# Patient Record
Sex: FEMALE | Race: WHITE | Employment: FULL TIME | URBAN - METROPOLITAN AREA
[De-identification: names, ages, dates, MRNs, and addresses within clinical notes are randomized per-mention and may not be internally consistent; named-entity substitution may affect disease eponyms.]

---

## 2017-01-30 ENCOUNTER — OFFICE VISIT (OUTPATIENT)
Dept: ORTHOPEDIC SURGERY | Age: 62
End: 2017-01-30

## 2017-01-30 VITALS
HEART RATE: 76 BPM | DIASTOLIC BLOOD PRESSURE: 79 MMHG | SYSTOLIC BLOOD PRESSURE: 157 MMHG | BODY MASS INDEX: 20.24 KG/M2 | HEIGHT: 62 IN | WEIGHT: 110 LBS

## 2017-01-30 DIAGNOSIS — M54.12 RADICULOPATHY, CERVICAL: ICD-10-CM

## 2017-01-30 DIAGNOSIS — M48.02 SPINAL STENOSIS, CERVICAL REGION: Primary | ICD-10-CM

## 2017-01-30 DIAGNOSIS — M50.30 OTHER CERVICAL DISC DEGENERATION, UNSPECIFIED CERVICAL REGION: ICD-10-CM

## 2017-01-30 RX ORDER — TOPIRAMATE 25 MG/1
TABLET ORAL
Qty: 180 TAB | Refills: 1 | Status: SHIPPED | OUTPATIENT
Start: 2017-01-30 | End: 2017-06-23 | Stop reason: ALTCHOICE

## 2017-01-30 RX ORDER — TOPIRAMATE 25 MG/1
TABLET ORAL
Qty: 180 TAB | Refills: 1 | Status: SHIPPED | OUTPATIENT
Start: 2017-01-30 | End: 2017-01-30 | Stop reason: CLARIF

## 2017-01-30 NOTE — PROGRESS NOTES
[facility]  [address]  [address], [address] [place]   Phone: [phone]  Fax: [phone]        PROGRESS NOTE      HISTORY OF PRESENT ILLNESS:  The patient is a 62 y.o. female and was seen today for follow up of neck pain into the BUE and low back pain extending into the RLE to the knee. She reports of one episode of severe pain of the LUE with a cold sensation. Pain is exacerbated with activity. Initially, she was seen with c/o neck pain extending into RUE as well as low back/right hip pain without radicular symptoms. Pt reports pain in RUE in a C6-C7 distribution into the hand, which are intermittent She reports LOB and dropping objects have not worsened. She notes her pain is the worst when she is at work or with increased activity in general. Pt has a h/o extensive spinal fusion due to h/o scoliosis. She states her pain worsens as the day progresses. She reports dropping objects but states it has not gotten worse. Pt reports loss of balance and dropping things have worsened and she has noticed she tolerates more frequently. Previously, she reported LOB and dropping objects with her right hand. She reports she had a spinal fusion done due to scoliosis > 30 years ago. She cannot recall at what level. She reports her pain began to get progressively worse 6 weeks ago, she denies any specific injury. She failed NEURONTIN. She tolerates increased Topamax 75 mg qhs with questionable relief. She is not a candidate for Cymbalta as she is taking Celexa and Desyrel. She has been treated with antiinflammatories, muscle relaxers, and steroids without relief. Patient performs her HEP daily. Patient denies change in bowel or bladder habits, but notes her uterus and bladder have dropped. Pt denies hx of glaucoma or DM. RUE EMG dated 6/7/16 was within normal limits. Preliminary reading of the cervical spine plain films revealed, degenerative disc disease at C4-C5 and C5-C6.  No acute pathology identified. Preliminary reading of the thoracic spine plain films revealed, moderate scoliotic curvature of the thoracic spine convex to the right at the L1 level. No acute pathology identified. Cervical spine MRI from 12/16/15 was reviewed. Per report, there was straightening with mild ligamentous laxity at C4-C5. There was degenerative joint and disc disease at C3-C4 through C7-T1. There was posterior bulging/protruding discs at C3-C4 through C7-T1 more significant at C4-C5 and C6-C7. Mild ventral cord deformity at C5-C6 with mild to moderate central canal stenosis. Mild central canal stenosis at C5-C6 and C6-C7. There was foraminal narrowing at C3-C4 through C7-T1 most significant at C5-C6 where it is moderate to severe by degree bilaterally. There was a right thyroid lesion. At her last clinical appointment, she was interested in surgical intervention. The patient weaned off Topamax 75 mg qhs. I tried her on Lyrica 75 mg BID. I encouraged patient to perform her HEP daily. The patient returns today with pain location and distribution remain unchanged. She continues to rate pain 3/10. She reports intolerance to [facility] due to facial/tongue swelling and lethargy. Subsequently, she restarted Topamax 75 mg qhs with benefit.  reviewed. Past Medical History   Diagnosis Date    Psychiatric disorder         Social History     Social History    Marital status: SINGLE     Spouse name: N/A    Number of children: N/A    Years of education: N/A     Occupational History    Not on file.      Social History Main Topics    Smoking status: Never Smoker    Smokeless tobacco: Never Used    Alcohol use No    Drug use: No    Sexual activity: Not on file     Other Topics Concern    Not on file     Social History Narrative       Current Outpatient Prescriptions   Medication Sig Dispense Refill    topiramate (TOPAMAX) 25 mg tablet 3 tabs PO  Tab 1    topiramate (TOPAMAX) 25 mg tablet TAKE 3 PO Q HS 90 Tab 0    meloxicam (MOBIC) 15 mg tablet       topiramate (TOPAMAX) 25 mg tablet TAKE THREE TABLETS BY MOUTH ONCE NIGHTLY AT BEDTIME 90 Tab 0    topiramate (TOPAMAX) 25 mg tablet 3 tabs PO QHS 90 Tab 1    atorvastatin (LIPITOR) 20 mg tablet       citalopram (CELEXA) 40 mg tablet       traZODone (DESYREL) 100 mg tablet       pregabalin (LYRICA) 75 mg capsule Take 1 Cap by mouth two (2) times a day. Max Daily Amount: 150 mg. 60 Cap 1    pregabalin (LYRICA) 75 mg capsule Take 1 Cap by mouth two (2) times a day. Max Daily Amount: 150 mg. 14 Cap 0    solifenacin (VESICARE) 10 mg tablet Take 10 mg by mouth daily.  gabapentin (NEURONTIN) 600 mg tablet Take 1 Tab by mouth three (3) times daily. 90 Tab 2    gabapentin (NEURONTIN) 600 mg tablet Take 1 Tab by mouth three (3) times daily. 90 Tab 0    naproxen (NAPROSYN) 500 mg tablet Take 1 tablet(s) twice a day by oral route for 30 days.  gabapentin (NEURONTIN) 300 mg capsule Take 1 Cap by mouth three (3) times daily. 90 Cap 1    cyclobenzaprine (FLEXERIL) 5 mg tablet       traMADol (ULTRAM) 50 mg tablet          Allergies   Allergen Reactions    Sulfa (Sulfonamide Antibiotics) Anaphylaxis          PHYSICAL EXAMINATION    Visit Vitals    /79    Pulse 76    Ht 5' 2\" (1.575 m)    Wt 110 lb (49.9 kg)    BMI 20.12 kg/m2       CONSTITUTIONAL: NAD, A&O x 3  SENSATION: Decreased sensation to light touch at digits 1-2 of the RUE. Sensation to light touch otherwise intact. NEURO: Edin's is negative bilaterally. RANGE OF MOTION: The patient has full passive range of motion in all four extremities.   MOTOR:  Straight Leg Raise: Negative, bilateral     Shoulder AB/Flex Elbow Flex Wrist Ext Elbow Ext Wrist Flex Hand Intrin Tone   Right +4/5 +4/5 +4/5 +4/5 +4/5 +4/5 +4/5   Left +4/5 +4/5 +4/5 +4/5 +4/5 +4/5 +4/5              Hip Flex Knee Ext Knee Flex Ankle DF GTE Ankle PF Tone   Right +4/5 +4/5 +4/5 +4/5 +4/5 +4/5 +4/5   Left +4/5 +4/5 +4/5 +4/5 +4/5 +4/5 +4/5       ASSESSMENT   Major Casillas was seen today for back pain and follow-up. Diagnoses and all orders for this visit:    Spinal stenosis, cervical region    Other cervical disc degeneration, unspecified cervical region    Radiculopathy, cervical    Other orders  -     Discontinue: topiramate (TOPAMAX) 25 mg tablet; 3 tabs PO QHS  -     topiramate (TOPAMAX) 25 mg tablet; 3 tabs PO BID          IMPRESSION AND PLAN:  Patient is not interested in surgical intervention or lumbar blocks at this time. She reports intolerance to Lyrica and subsequently restarted Topamax 75 mg qhs. I will increase her Topamax to 75 mg BID. I will see the patient back in 1 month's time or earlier if needed. Written by Ye Barillas, as dictated by Marline Freedman MD  I examined the patient, reviewed and agree with the note.

## 2017-01-30 NOTE — MR AVS SNAPSHOT
Visit Information Date & Time Provider Department Dept. Phone Encounter #  
 1/30/2017 10:25 AM Jax Betts MD 4 [facility], [address] and [facility] [place] [phone] [id number] Follow-up Instructions Return in about 4 weeks (around 2/27/2017). Upcoming Health Maintenance Date Due Hepatitis C Screening 1955 DTaP/Tdap/Td series (1 - Tdap) 12/14/1977 PAP AKA CERVICAL CYTOLOGY 12/14/1977 BREAST CANCER SCRN MAMMOGRAM 12/14/2006 FOBT Q 1 YEAR AGE 50-75 12/14/2006 INFLUENZA AGE 9 TO ADULT 8/1/2016 ZOSTER VACCINE AGE 60> 12/14/2016 Allergies as of 1/30/2017  Review Complete On: 1/30/2017 By: Jax Betts MD  
  
 Severity Noted Reaction Type Reactions Sulfa (Sulfonamide Antibiotics) High 10/28/2015    Anaphylaxis Current Immunizations  Never Reviewed No immunizations on file. Not reviewed this visit You Were Diagnosed With   
  
 Codes Comments Spinal stenosis, cervical region    -  Primary ICD-10-CM: M48.02 
ICD-9-CM: 723.0 Other cervical disc degeneration, unspecified cervical region     ICD-10-CM: M50.30 ICD-9-CM: 722.4 Radiculopathy, cervical     ICD-10-CM: M54.12 
ICD-9-CM: 723.4 Vitals BP Pulse Height(growth percentile) Weight(growth percentile) BMI Smoking Status 157/79 76 5' 2\" (1.575 m) 110 lb (49.9 kg) 20.12 kg/m2 Never Smoker Vitals History BMI and BSA Data Body Mass Index Body Surface Area  
 20.12 kg/m 2 1.48 m 2 Preferred Pharmacy Pharmacy Name Phone [facility], [address] [place] [phone] Your Updated Medication List  
  
   
This list is accurate as of: 1/30/17 10:45 AM.  Always use your most recent med list.  
  
  
  
  
 atorvastatin 20 mg tablet Commonly known as:  LIPITOR  
  
 citalopram 40 mg tablet Commonly known as:  CELEXA  
  
 cyclobenzaprine 5 mg tablet Commonly known as:  FLEXERIL  
  
 * gabapentin 300 mg capsule Commonly known as:  NEURONTIN Take 1 Cap by mouth three (3) times daily. * gabapentin 600 mg tablet Commonly known as:  NEURONTIN Take 1 Tab by mouth three (3) times daily. * gabapentin 600 mg tablet Commonly known as:  NEURONTIN Take 1 Tab by mouth three (3) times daily. meloxicam 15 mg tablet Commonly known as:  MOBIC  
  
 NAPROSYN 500 mg tablet Generic drug:  naproxen Take 1 tablet(s) twice a day by oral route for 30 days. * pregabalin 75 mg capsule Commonly known as:  Nina Ruff Take 1 Cap by mouth two (2) times a day. Max Daily Amount: 150 mg.  
  
 * pregabalin 75 mg capsule Commonly known as:  Jinnie Ruff Take 1 Cap by mouth two (2) times a day. Max Daily Amount: 150 mg.  
  
 solifenacin 10 mg tablet Commonly known as:  Radha Fairfax Take 10 mg by mouth daily. * topiramate 25 mg tablet Commonly known as:  TOPAMAX 3 tabs PO QHS * topiramate 25 mg tablet Commonly known as:  TOPAMAX TAKE THREE TABLETS BY MOUTH ONCE NIGHTLY AT BEDTIME  
  
 * topiramate 25 mg tablet Commonly known as:  TOPAMAX TAKE 3 PO Q HS  
  
 * topiramate 25 mg tablet Commonly known as:  TOPAMAX 3 tabs PO BID  
  
 traMADol 50 mg tablet Commonly known as:  ULTRAM  
  
 traZODone 100 mg tablet Commonly known as:  Ken Griffiths * Notice: This list has 9 medication(s) that are the same as other medications prescribed for you. Read the directions carefully, and ask your doctor or other care provider to review them with you. Prescriptions Sent to Pharmacy Refills  
 topiramate (TOPAMAX) 25 mg tablet 1 Sig: 3 tabs PO BID Class: Normal  
 Pharmacy: [address] [facility], [address] [facility] #: [phone] Follow-up Instructions Return in about 4 weeks (around 2/27/2017). Introducing [facility] & HEALTH SERVICES!    
 [facility] introduces [employer] patient portal. Now you can access parts of your medical record, email your doctor's office, and request medication refills online. 1. In your internet browser, go to [email or web address] 2. Click on the First Time User? Click Here link in the Sign In box. You will see the New Member Sign Up page. 3. Enter your AREVS Access Code exactly as it appears below. You will not need to use this code after youve completed the sign-up process. If you do not sign up before the expiration date, you must request a new code. · AREVS Access Code: [email or web address] Expires: 2/26/2017 12:45 PM 
 
4. Enter the last four digits of your Social Security Number (xxxx) and Date of Birth (mm/dd/yyyy) as indicated and click Submit. You will be taken to the next sign-up page. 5. Create a AREVS ID. This will be your AREVS login ID and cannot be changed, so think of one that is secure and easy to remember. 6. Create a AREVS password. You can change your password at any time. 7. Enter your Password Reset Question and Answer. This can be used at a later time if you forget your password. 8. Enter your e-mail address. You will receive e-mail notification when new information is available in [address] 9. Click Sign Up. You can now view and download portions of your medical record. 10. Click the Download Summary menu link to download a portable copy of your medical information. If you have questions, please visit the Frequently Asked Questions section of the AREVS website. Remember, AREVS is NOT to be used for urgent needs. For medical emergencies, dial 911. Now available from your iPhone and Android! Please provide this summary of care documentation to your next provider. Your primary care clinician is listed as Araceli Zapien. If you have any questions after today's visit, please call [phone].

## 2017-02-06 ENCOUNTER — TELEPHONE (OUTPATIENT)
Dept: ORTHOPEDIC SURGERY | Age: 62
End: 2017-02-06

## 2017-02-06 NOTE — TELEPHONE ENCOUNTER
Called patients pharmacy and did a verbal order for the Topamax 25 mg #180 take 3 po bid with 1 refill.

## 2017-02-06 NOTE — TELEPHONE ENCOUNTER
Called and informed patient that the medication has been called into her [address] for the Topamax 25 mg #180 3 po bid with 1 refill. Patient verbalized understanding.

## 2017-02-06 NOTE — TELEPHONE ENCOUNTER
Pt seen 1/30/16:  Pt states dr Nataly Bennett increased her gabapentin but did not write the prescription to accommodate the increase.  pls advise pt p#[phone]

## 2017-06-15 RX ORDER — TOPIRAMATE 25 MG/1
TABLET ORAL
Qty: 180 TAB | Refills: 0 | OUTPATIENT
Start: 2017-06-15

## 2017-06-15 NOTE — TELEPHONE ENCOUNTER
Patient would have already run out of medication. Needs to be evaluated prior to re-starting medication.  Call patient to inform

## 2017-06-23 RX ORDER — TOPIRAMATE 25 MG/1
TABLET ORAL
Qty: 90 TAB | Refills: 0 | Status: SHIPPED | OUTPATIENT
Start: 2017-06-23 | End: 2017-08-22 | Stop reason: SDUPTHER

## 2017-06-23 NOTE — TELEPHONE ENCOUNTER
Patient called back stating she has been taking 3 tabs nightly due to an issue with the last prescription. She is requesting a courtesy fill to get her through the next appointment. Please advise. Per pharm tech at [facility], the order below is the last prescription they received from Dr. Sushil Barros.      topiramate (TOPAMAX) 25 mg tablet  180 Tab 1 1/30/2017 1/30/2017    Sig: 3 tabs PO QHS

## 2017-07-03 ENCOUNTER — OFFICE VISIT (OUTPATIENT)
Dept: ORTHOPEDIC SURGERY | Age: 62
End: 2017-07-03

## 2017-07-03 VITALS
DIASTOLIC BLOOD PRESSURE: 71 MMHG | RESPIRATION RATE: 18 BRPM | WEIGHT: 103.6 LBS | BODY MASS INDEX: 19.06 KG/M2 | HEIGHT: 62 IN | HEART RATE: 89 BPM | SYSTOLIC BLOOD PRESSURE: 146 MMHG

## 2017-07-03 DIAGNOSIS — M54.12 RADICULOPATHY, CERVICAL: ICD-10-CM

## 2017-07-03 DIAGNOSIS — M50.30 OTHER CERVICAL DISC DEGENERATION, UNSPECIFIED CERVICAL REGION: ICD-10-CM

## 2017-07-03 DIAGNOSIS — M48.03 SPINAL STENOSIS, CERVICOTHORACIC REGION: Primary | ICD-10-CM

## 2017-07-03 DIAGNOSIS — M19.041 OSTEOARTHRITIS OF RIGHT HAND, UNSPECIFIED OSTEOARTHRITIS TYPE: ICD-10-CM

## 2017-07-03 RX ORDER — TOPIRAMATE 25 MG/1
TABLET ORAL
Qty: 180 TAB | Refills: 1 | Status: SHIPPED | OUTPATIENT
Start: 2017-07-03 | End: 2018-05-21 | Stop reason: ALTCHOICE

## 2017-07-03 NOTE — MR AVS SNAPSHOT
Visit Information Date & Time Provider Department Dept. Phone Encounter #  
 7/3/2017 10:15 AM Rodolfo Etienne MD 4 [facility], [address] and [facility] - [place] [phone] [id number] Follow-up Instructions Return for following MRI. Upcoming Health Maintenance Date Due Hepatitis C Screening 1955 DTaP/Tdap/Td series (1 - Tdap) 12/14/1977 PAP AKA CERVICAL CYTOLOGY 12/14/1977 BREAST CANCER SCRN MAMMOGRAM 12/14/2006 FOBT Q 1 YEAR AGE 50-75 12/14/2006 ZOSTER VACCINE AGE 60> 12/14/2016 INFLUENZA AGE 9 TO ADULT 8/1/2017 Allergies as of 7/3/2017  Review Complete On: 7/3/2017 By: Rodolfo Etienne MD  
  
 Severity Noted Reaction Type Reactions Sulfa (Sulfonamide Antibiotics) High 10/28/2015    Anaphylaxis Current Immunizations  Never Reviewed No immunizations on file. Not reviewed this visit You Were Diagnosed With   
  
 Codes Comments Spinal stenosis, cervicothoracic region    -  Primary ICD-10-CM: M48.03 
ICD-9-CM: 723.0 Other cervical disc degeneration, unspecified cervical region     ICD-10-CM: M50.30 ICD-9-CM: 722.4 Radiculopathy, cervical     ICD-10-CM: M54.12 
ICD-9-CM: 723.4 Osteoarthritis of right hand, unspecified osteoarthritis type     ICD-10-CM: M19.041 ICD-9-CM: 715.94 1st digit Vitals BP Pulse Resp Height(growth percentile) Weight(growth percentile) BMI  
 146/71 89 18 5' 2\" (1.575 m) 103 lb 9.6 oz (47 kg) 18.95 kg/m2 Smoking Status Never Smoker Vitals History BMI and BSA Data Body Mass Index Body Surface Area 18.95 kg/m 2 1.43 m 2 Preferred Pharmacy Pharmacy Name Phone [facility] [place] [address], [address] [place] [phone] Your Updated Medication List  
  
   
This list is accurate as of: 7/3/17 10:39 AM.  Always use your most recent med list.  
  
  
  
  
 atorvastatin 20 mg tablet Commonly known as:  LIPITOR  
  
 citalopram 40 mg tablet Commonly known as:  CELEXA  
  
 cyclobenzaprine 5 mg tablet Commonly known as:  FLEXERIL  
  
 * gabapentin 300 mg capsule Commonly known as:  NEURONTIN Take 1 Cap by mouth three (3) times daily. * gabapentin 600 mg tablet Commonly known as:  NEURONTIN Take 1 Tab by mouth three (3) times daily. * gabapentin 600 mg tablet Commonly known as:  NEURONTIN Take 1 Tab by mouth three (3) times daily. meloxicam 15 mg tablet Commonly known as:  MOBIC  
  
 NAPROSYN 500 mg tablet Generic drug:  naproxen Take 1 tablet(s) twice a day by oral route for 30 days. * pregabalin 75 mg capsule Commonly known as:  Jesus Leyland Take 1 Cap by mouth two (2) times a day. Max Daily Amount: 150 mg.  
  
 * pregabalin 75 mg capsule Commonly known as:  Jesus Leyland Take 1 Cap by mouth two (2) times a day. Max Daily Amount: 150 mg.  
  
 solifenacin 10 mg tablet Commonly known as:  Rolando Montesinos Take 10 mg by mouth daily. * topiramate 25 mg tablet Commonly known as:  TOPAMAX 3 tabs PO QHS * topiramate 25 mg tablet Commonly known as:  TOPAMAX 3 tabs PO BID  
  
 traMADol 50 mg tablet Commonly known as:  ULTRAM  
  
 traZODone 100 mg tablet Commonly known as:  Terpippa Rader * Notice: This list has 7 medication(s) that are the same as other medications prescribed for you. Read the directions carefully, and ask your doctor or other care provider to review them with you. Prescriptions Sent to Pharmacy Refills  
 topiramate (TOPAMAX) 25 mg tablet 1 Sig: 3 tabs PO BID Class: Normal  
 Pharmacy: [facility], [address] [facility] #: [phone] We Performed the Following REFERRAL TO ORTHOPEDICS [JZC357 Custom] Comments:  
 eval right first MCP- pain/arthritis Follow-up Instructions Return for following MRI. To-Do List   
 07/10/2017 Imaging:  MRI CERV SPINE WO CONT Referral Information Referral ID Referred By Referred To  
  
 [id number] ROSA GONZÁLES III Not Available Visits Status Start Date End Date 1 New Request 7/3/17 7/3/18 If your referral has a status of pending review or denied, additional information will be sent to support the outcome of this decision. Introducing [facility] & HEALTH SERVICES! Javier Payne introduces [employer] patient portal. Now you can access parts of your medical record, email your doctor's office, and request medication refills online. 1. In your internet browser, go to [email or web address] 2. Click on the First Time User? Click Here link in the Sign In box. You will see the New Member Sign Up page. 3. Enter your [employer] Access Code exactly as it appears below. You will not need to use this code after youve completed the sign-up process. If you do not sign up before the expiration date, you must request a new code. · [employer] Access Code: [email or web address]. Expires: 10/1/2017 10:29 AM 
 
4. Enter the last four digits of your Social Security Number (xxxx) and Date of Birth (mm/dd/yyyy) as indicated and click Submit. You will be taken to the next sign-up page. 5. Create a [employer] ID. This will be your [employer] login ID and cannot be changed, so think of one that is secure and easy to remember. 6. Create a [employer] password. You can change your password at any time. 7. Enter your Password Reset Question and Answer. This can be used at a later time if you forget your password. 8. Enter your e-mail address. You will receive e-mail notification when new information is available in [address] 9. Click Sign Up. You can now view and download portions of your medical record. 10. Click the Download Summary menu link to download a portable copy of your medical information.  
 
If you have questions, please visit the Frequently Asked Questions section of the brick&mobile. Remember, [employer] is NOT to be used for urgent needs. For medical emergencies, dial 911. Now available from your iPhone and Android! Please provide this summary of care documentation to your next provider. Your primary care clinician is listed as Shanice Callejas. If you have any questions after today's visit, please call [phone].

## 2017-07-03 NOTE — PROGRESS NOTES
[facility]  [address], [address]  Phone: [phone]  Fax: [phone]        PROGRESS NOTE      HISTORY OF PRESENT ILLNESS:  The patient is a 62 y.o. female and was seen today for follow up of neck pain into the BUE and low back pain extending into the RLE to the knee. She reports of one episode of severe pain of the LUE with a cold sensation. Pain is exacerbated with activity. Initially, she was seen with c/o neck pain extending into RUE as well as low back/right hip pain without radicular symptoms. Pt reports pain in RUE in a C6-C7 distribution into the hand, which are intermittent. She notes her pain is the worst when she is at work or with increased activity in general. Pt has a h/o extensive spinal fusion due to h/o scoliosis. She states her pain worsens as the day progresses. She reports dropping objects but states it has not gotten worse. Pt reports loss of balance and dropping things have worsened and she has noticed she tolerates more frequently. Previously, she reported LOB and dropping objects with her right hand. She reports she had a spinal fusion done due to scoliosis > 30 years ago. She cannot recall at what level. She reports her pain began to get progressively worse 6 weeks ago, she denies any specific injury. She failed NEURONTIN. She tolerates increased Topamax 75 mg qhs with questionable relief. She reports intolerance to [facility] due to facial/tongue swelling and lethargy. Subsequently, she restarted Topamax 75 mg qhs with benefit. She is not a candidate for Cymbalta as she is taking Celexa and Desyrel. She has been treated with antiinflammatories, muscle relaxers, and steroids without relief. Patient performs her HEP daily. Patient denies change in bowel or bladder habits, but notes her uterus and bladder have dropped. Pt denies hx of glaucoma or DM. A RUE EMG dated 6/7/16 was within normal limits.  Preliminary reading of the cervical spine plain films revealed, degenerative disc disease at C4-C5 and C5-C6. No acute pathology identified. Preliminary reading of the thoracic spine plain films revealed, moderate scoliotic curvature of the thoracic spine convex to the right at the L1 level. No acute pathology identified. Cervical spine MRI from 12/16/15 was reviewed. Per report, there was straightening with mild ligamentous laxity at C4-C5. There was degenerative joint and disc disease at C3-C4 through C7-T1. There was posterior bulging/protruding discs at C3-C4 through C7-T1 more significant at C4-C5 and C6-C7. Mild ventral cord deformity at C5-C6 with mild to moderate central canal stenosis. Mild central canal stenosis at C5-C6 and C6-C7. There was foraminal narrowing at C3-C4 through C7-T1 most significant at C5-C6 where it is moderate to severe by degree bilaterally. There was a right thyroid lesion. At her last clinical appointment, patient was not interested in surgical intervention or lumbar blocks at that time. She reported intolerance to Lyrica and subsequently restarted Topamax 75 mg qhs. I increased her Topamax to 75 mg BID. The patient returns today with low back and right buttock pain extending in an S1 distribution to the knee along with a burning sensation of the right ankle. Pt also endorses neck pain radiating into the RUE in a C6/7 distribution to the hand with paraesthesias. Her LUE continues to do well. Her primary complaint currently is of first metacarpophalangeal joint pain, first digit of the RUE, which I believe is more consistent with arthritis. She rates pain 4-10/10, elevated since her last visit (3/10). Pt reports progressive LOB and impairments in manual dexterity with the RUE. She reports she has seen Dr. Jason Chaudhry for her right hand pain in the past who administered a cortisone injection with good relief.  Pt states her last prescription for Topamax was written as 25 mg 3 tabs qhs and was unable to increase to 75 mg BID.  reviewed. Past Medical History:   Diagnosis Date    Psychiatric disorder         Social History     Social History    Marital status: SINGLE     Spouse name: N/A    Number of children: N/A    Years of education: N/A     Occupational History    Not on file. Social History Main Topics    Smoking status: Never Smoker    Smokeless tobacco: Never Used    Alcohol use No    Drug use: No    Sexual activity: Not on file     Other Topics Concern    Not on file     Social History Narrative       Current Outpatient Prescriptions   Medication Sig Dispense Refill    topiramate (TOPAMAX) 25 mg tablet 3 tabs PO  Tab 1    topiramate (TOPAMAX) 25 mg tablet 3 tabs PO QHS 90 Tab 0    meloxicam (MOBIC) 15 mg tablet       citalopram (CELEXA) 40 mg tablet       traZODone (DESYREL) 100 mg tablet       pregabalin (LYRICA) 75 mg capsule Take 1 Cap by mouth two (2) times a day. Max Daily Amount: 150 mg. 60 Cap 1    pregabalin (LYRICA) 75 mg capsule Take 1 Cap by mouth two (2) times a day. Max Daily Amount: 150 mg. 14 Cap 0    solifenacin (VESICARE) 10 mg tablet Take 10 mg by mouth daily.  gabapentin (NEURONTIN) 600 mg tablet Take 1 Tab by mouth three (3) times daily. 90 Tab 2    gabapentin (NEURONTIN) 600 mg tablet Take 1 Tab by mouth three (3) times daily. 90 Tab 0    naproxen (NAPROSYN) 500 mg tablet Take 1 tablet(s) twice a day by oral route for 30 days.  gabapentin (NEURONTIN) 300 mg capsule Take 1 Cap by mouth three (3) times daily.  90 Cap 1    atorvastatin (LIPITOR) 20 mg tablet       cyclobenzaprine (FLEXERIL) 5 mg tablet       traMADol (ULTRAM) 50 mg tablet          Allergies   Allergen Reactions    Sulfa (Sulfonamide Antibiotics) Anaphylaxis        PHYSICAL EXAMINATION    Visit Vitals    /71    Pulse 89    Resp 18    Ht 5' 2\" (1.575 m)    Wt 103 lb 9.6 oz (47 kg)    BMI 18.95 kg/m2       CONSTITUTIONAL: NAD, A&O x 3  SENSATION: Intact to light touch throughout  NEURO: Edin's is negative bilaterally. RANGE OF MOTION: The patient has full passive range of motion in all four extremities. MOTOR:  Straight Leg Raise: Negative, bilateral  Tandem Gait: Mild LOB     Shoulder AB/Flex Elbow Flex Wrist Ext Elbow Ext Wrist Flex Hand Intrin Tone   Right +4/5 +4/5 +4/5 +4/5 +4/5 +4/5 +4/5   Left +4/5 +4/5 +4/5 +4/5 +4/5 +4/5 +4/5              Hip Flex Knee Ext Knee Flex Ankle DF GTE Ankle PF Tone   Right +4/5 +4/5 +4/5 +4/5 +4/5 +4/5 +4/5   Left +4/5 +4/5 +4/5 +4/5 +4/5 +4/5 +4/5       ASSESSMENT   [place] was seen today for neck pain, back pain, numbness, ankle pain, hand pain and leg pain. Diagnoses and all orders for this visit:    Spinal stenosis, cervicothoracic region  -     REFERRAL TO ORTHOPEDICS  -     MRI CERV SPINE WO CONT; Future    Other cervical disc degeneration, unspecified cervical region  -     REFERRAL TO ORTHOPEDICS  -     MRI CERV SPINE WO CONT; Future    Radiculopathy, cervical  -     REFERRAL TO ORTHOPEDICS  -     MRI CERV SPINE WO CONT; Future    Osteoarthritis of right hand, unspecified osteoarthritis type  Comments:  1st digit  Orders:  -     REFERRAL TO ORTHOPEDICS  -     MRI CERV SPINE WO CONT; Future    Other orders  -     topiramate (TOPAMAX) 25 mg tablet; 3 tabs PO BID          IMPRESSION AND PLAN:  The patient is be considering cervical spinal surgery as her LOB and impairments in manual dexterity have slowly progressed. I will set her up for a cervical spine MRI. Concerning her right first MCP joint pain, I will refer to ortho for further evaluation. I will increase her Topamax 75 mg to BID. Patient advised to call the office if intolerant to higher dose. I will see the patient back following MRI. Written by Nicolas Paul, as dictated by Becca Hilton MD  I examined the patient, reviewed and agree with the note.

## 2017-07-17 ENCOUNTER — TELEPHONE (OUTPATIENT)
Dept: ORTHOPEDIC SURGERY | Age: 62
End: 2017-07-17

## 2017-07-17 NOTE — TELEPHONE ENCOUNTER
While attempting to schedule a MRI of the Cervical Spine, patient stated she is supposed to be getting other or more testing. When questioned closer, she could not identify any other testing by name but feels she should be having more done. Please verify testing request for additional services and call patient to advise. Thank you. Please let me know which changes if any are to be made so I may proceed with the MRI scheduling. Thank you again.

## 2017-07-17 NOTE — TELEPHONE ENCOUNTER
I will call again to schedule her MRI. Please documentation only notes for further information. Thank you.

## 2017-07-17 NOTE — TELEPHONE ENCOUNTER
After looking at the office note from her last appointment, it looks like we were referring her to Ortho. That appointment is with Dr. Shine Villegas on 7-26-17. I spoke with . Malachi Davis and she states that she was calling because she had not heard anything about her MRI. I informed her that Ana Bullock would be sending the order over to get her scheduled.

## 2017-07-26 ENCOUNTER — OFFICE VISIT (OUTPATIENT)
Dept: ORTHOPEDIC SURGERY | Age: 62
End: 2017-07-26

## 2017-07-26 VITALS
BODY MASS INDEX: 19.62 KG/M2 | TEMPERATURE: 97.7 F | WEIGHT: 106.6 LBS | SYSTOLIC BLOOD PRESSURE: 125 MMHG | HEIGHT: 62 IN | RESPIRATION RATE: 18 BRPM | DIASTOLIC BLOOD PRESSURE: 67 MMHG | OXYGEN SATURATION: 100 % | HEART RATE: 73 BPM

## 2017-07-26 DIAGNOSIS — R20.2 NUMBNESS AND TINGLING IN RIGHT HAND: ICD-10-CM

## 2017-07-26 DIAGNOSIS — R20.0 NUMBNESS AND TINGLING IN RIGHT HAND: ICD-10-CM

## 2017-07-26 DIAGNOSIS — M41.50 DEGENERATIVE SCOLIOSIS: ICD-10-CM

## 2017-07-26 DIAGNOSIS — M79.644 THUMB PAIN, RIGHT: Primary | ICD-10-CM

## 2017-07-26 RX ORDER — BETAMETHASONE SODIUM PHOSPHATE AND BETAMETHASONE ACETATE 3; 3 MG/ML; MG/ML
3 INJECTION, SUSPENSION INTRA-ARTICULAR; INTRALESIONAL; INTRAMUSCULAR; SOFT TISSUE ONCE
Qty: 0.5 ML | Refills: 0
Start: 2017-07-26 | End: 2017-07-26

## 2017-07-26 RX ORDER — BUPIVACAINE HYDROCHLORIDE 2.5 MG/ML
0.5 INJECTION, SOLUTION EPIDURAL; INFILTRATION; INTRACAUDAL ONCE
Qty: 0.5 ML | Refills: 0
Start: 2017-07-26 | End: 2017-07-26

## 2017-07-26 RX ORDER — DICLOFENAC EPOLAMINE 0.01 G/1
1 PATCH TOPICAL EVERY 12 HOURS
Qty: 30 PATCH | Refills: 2 | Status: SHIPPED | OUTPATIENT
Start: 2017-07-26 | End: 2020-09-21 | Stop reason: SDUPTHER

## 2017-07-26 NOTE — PROGRESS NOTES
Patient: Pablo Hernandez                MRN: [id number]       SSN: [id number]  YOB: 1955        AGE: 62 y.o. SEX: female    PCP: Leo Riley MD  07/26/17    Chief Complaint   Patient presents with    Hand Pain     Right     HISTORY:  Pablo Hernandez is a 62 y.o. female who is seen for right hand pain. She was referred by Dr. Magui Stuart. She notes sharp stabbing pain with tingling and numbness in the right hand. Pt states that she had two prior EMG/NCS by Dr. Magui Stuart and per Pt, the results were normal. She notes difficulty with pinching, gripping and opening doors. She has a history of cervical spinal stenosis. Her pain occasionally radiates up to the right elbow and occasionally to the shoulder. She notes a history of scoliosis. Pt has been taking ibuprofen and Mobic. Pain Assessment  7/26/2017   Location of Pain Hand   Pain Location Comment -   Location Modifiers Right   Severity of Pain 3   Quality of Pain Throbbing; [place]; Aching;Burning   Quality of Pain Comment -   Duration of Pain Persistent   Frequency of Pain Constant   Aggravating Factors Bending;Stretching;Straightening   Aggravating Factors Comment -   Limiting Behavior Yes   Relieving Factors Nothing   Relieving Factors Comment -   Result of Injury No     Occupation, etc:  Ms. Donaldo Tariq works as a  at [employer] in [employer] [facility]. She has been working at [employer] for 8 years and is considering long-term in the near future due to back related disability. Pt lives in [place] with her mother. Current weight is 106 pounds. She is 5'2\" tall. She is right hand dominant. Pt has two 21 y.o. grandchildren and one 12 y.o grandchild. She has an 37 yo son.      No results found for: HBA1C, HGBE8, KLE8PVYH, ESS7SBNN, AJI2HVPO  Weight Metrics 7/26/2017 7/3/2017 1/30/2017 11/28/2016 8/9/2016 6/6/2016 3/4/2016   Weight 106 lb 9.6 oz 103 lb 9.6 oz 110 lb 111 lb 12.8 oz 118 lb 124 lb 126 lb 9.6 oz   BMI 19.5 kg/m2 18.95 kg/m2 20.12 kg/m2 20.78 kg/m2 21.94 kg/m2 23.05 kg/m2 21.07 kg/m2       Patient Active Problem List   Diagnosis Code    Cervical pain M54.2    Lumbago M54.5    Notalgia M54.9    Cervical spinal stenosis M48.02    Cervical neuritis M54.12    DDD (degenerative disc disease), cervical M50.30    Spinal stenosis, cervical region M48.02    Radiculopathy, cervical region M54.12    Other cervical disc degeneration, unspecified cervical region M50.30    Spinal stenosis in cervical region M48.02    Radiculopathy, cervical M54.12     REVIEW OF SYSTEMS: All Below are Negative except: See HPI   Constitutional: negative for fever, chills, and weight loss. Cardiovascular: negative for chest pain, claudication, leg swelling, SOB, ROWLEY   Gastrointestinal: Negative for pain, N/V/C/D, Blood in stool or urine, dysuria,  hematuria, incontinence, pelvic pain. Musculoskeletal: See HPI   Neurological: Negative for dizziness and weakness. Negative for headaches, Visual changes, confusion, seizures   Phychiatric/Behavioral: Negative for depression, memory loss, substance  abuse. Extremities: Negative for hair changes, rash, or skin lesion changes. Hematologic: Negative for bleeding problems, bruising, pallor or swollen lymph  nodes   Peripheral Vascular: No calf pain, no circulation deficits. Social History     Social History    Marital status: SINGLE     Spouse name: N/A    Number of children: N/A    Years of education: N/A     Occupational History    Not on file.      Social History Main Topics    Smoking status: Never Smoker    Smokeless tobacco: Never Used    Alcohol use No    Drug use: No    Sexual activity: Not on file     Other Topics Concern    Not on file     Social History Narrative      Allergies   Allergen Reactions    Sulfa (Sulfonamide Antibiotics) Anaphylaxis      Current Outpatient Prescriptions   Medication Sig    topiramate (TOPAMAX) 25 mg tablet 3 tabs PO QHS    meloxicam (MOBIC) 15 mg tablet     citalopram (CELEXA) 40 mg tablet     traZODone (DESYREL) 100 mg tablet     topiramate (TOPAMAX) 25 mg tablet 3 tabs PO BID    pregabalin (LYRICA) 75 mg capsule Take 1 Cap by mouth two (2) times a day. Max Daily Amount: 150 mg.  pregabalin (LYRICA) 75 mg capsule Take 1 Cap by mouth two (2) times a day. Max Daily Amount: 150 mg.    solifenacin (VESICARE) 10 mg tablet Take 10 mg by mouth daily.  gabapentin (NEURONTIN) 600 mg tablet Take 1 Tab by mouth three (3) times daily.  gabapentin (NEURONTIN) 600 mg tablet Take 1 Tab by mouth three (3) times daily.  naproxen (NAPROSYN) 500 mg tablet Take 1 tablet(s) twice a day by oral route for 30 days.  gabapentin (NEURONTIN) 300 mg capsule Take 1 Cap by mouth three (3) times daily.  atorvastatin (LIPITOR) 20 mg tablet     cyclobenzaprine (FLEXERIL) 5 mg tablet     traMADol (ULTRAM) 50 mg tablet      No current facility-administered medications for this visit. PHYSICAL EXAMINATION:  Visit Vitals    /67    Pulse 73    Temp 97.7 °F (36.5 °C) (Oral)    Resp 18    Ht 5' 2\" (1.575 m)    Wt 106 lb 9.6 oz (48.4 kg)    SpO2 100%    BMI 19.5 kg/m2      ORTHO EXAMINATION:  Examination Right Hand Left Hand   Skin Intact Intact   Deformity - -   Swelling - -   Tenderness + base 1st MC -   Finger flexion Full Full   Finger extension Full Full   Sensation Normal Normal   Capillary refill Normal Normal   Heberden's nodes + +   Dupuytren's - -     Examination Right Wrist Left Wrist   Skin Intact Intact   Tenderness + radial wrist and base of thumb -   Flexion 30 40   Extension 30 30   Deformity - -   Effusion - -   Finger flexion Full Full   Finger extension Full Full   Tinel's sign - -   Phalen's test - -   Finklestein maneuver - -   Pain with thumb abduction + -   Capillary refill - -     Palpable osteophyte at the base of the first metacarpal. 45 degrees of abduction of the right thumb; 90 degrees in the left. Mild thenar atrophy.      PROCEDURE: After discussing treatment options, patient's right 1st metacarpal was injected with 4 cc Marcaine and 1/2 cc Celestone. Chart reviewed for the following:   Roel Pena MD, have reviewed the History, Physical and updated the Allergic reactions for [place] performed immediately prior to start of procedure:  Roel Pena MD, have performed the following reviews on Saleem Bush prior to the start of the procedure:            * Patient was identified by name and date of birth   * Agreement on procedure being performed was verified  * Risks and Benefits explained to the patient  * Procedure site verified and marked as necessary  * Patient was positioned for comfort  * Consent was obtained     Time: 9:46 AM     Date of procedure: 7/26/2017    Procedure performed by:  Nini Portillo MD    Ms. Hagan tolerated the procedure well with no complications. IMPRESSION:      ICD-10-CM ICD-9-CM    1. Thumb pain, right M79.644 729.5    2. Degenerative scoliosis M41.9 737.30    3. Numbness and tingling in right hand R20.0 782.0     R20.2          PLAN:  After discussing treatment options, patient's right first metacarpal was injected with 1/2 cc Marcaine and 1/2 cc Celestone. She will follow up as needed. There is no need for surgery at this time.       Scribed by Tiffany Williamson ([address]) as dictated by Nini Portillo MD

## 2017-07-26 NOTE — MR AVS SNAPSHOT
Visit Information Date & Time Provider Department Dept. Phone Encounter #  
 7/26/2017  8:45 AM Denisa Shahid, [address] [facility]  Your Appointments 8/22/2017  8:10 AM  
DIAG TEST F/U with Donna Tong MD  
[facility] ([address]) Appt Note: MRI RESULTS  
 [address] [facility] [id number]  
[address] [facility] Upcoming Health Maintenance Date Due Hepatitis C Screening 1955 DTaP/Tdap/Td series (1 - Tdap) 12/14/1977 PAP AKA CERVICAL CYTOLOGY 12/14/1977 BREAST CANCER SCRN MAMMOGRAM 12/14/2006 FOBT Q 1 YEAR AGE 50-75 12/14/2006 ZOSTER VACCINE AGE 60> 10/14/2016 INFLUENZA AGE 9 TO ADULT 8/1/2017 Allergies as of 7/26/2017  Review Complete On: 7/26/2017 By: Denisa Shahid MD  
  
 Severity Noted Reaction Type Reactions Sulfa (Sulfonamide Antibiotics) High 10/28/2015    Anaphylaxis Current Immunizations  Never Reviewed No immunizations on file. Not reviewed this visit You Were Diagnosed With   
  
 Codes Comments Thumb pain, right    -  Primary ICD-10-CM: N58.717 ICD-9-CM: 729.5 Degenerative scoliosis     ICD-10-CM: M41.9 ICD-9-CM: 737.30 Numbness and tingling in right hand     ICD-10-CM: R20.0, R20.2 ICD-9-CM: 806. 0 Vitals BP Pulse Temp Resp Height(growth percentile) Weight(growth percentile) 125/67 73 97.7 °F (36.5 °C) (Oral) 18 5' 2\" (1.575 m) 106 lb 9.6 oz (48.4 kg) SpO2 BMI Smoking Status 100% 19.5 kg/m2 Never Smoker BMI and BSA Data Body Mass Index Body Surface Area 19.5 kg/m 2 1.46 m 2 Preferred Pharmacy Pharmacy Name Phone [facility] [address] [place] [phone] Your Updated Medication List  
  
   
 This list is accurate as of: 7/26/17  9:50 AM.  Always use your most recent med list.  
  
  
  
  
 atorvastatin 20 mg tablet Commonly known as:  LIPITOR  
  
 citalopram 40 mg tablet Commonly known as:  CELEXA  
  
 cyclobenzaprine 5 mg tablet Commonly known as:  FLEXERIL  
  
 * gabapentin 300 mg capsule Commonly known as:  NEURONTIN Take 1 Cap by mouth three (3) times daily. * gabapentin 600 mg tablet Commonly known as:  NEURONTIN Take 1 Tab by mouth three (3) times daily. * gabapentin 600 mg tablet Commonly known as:  NEURONTIN Take 1 Tab by mouth three (3) times daily. meloxicam 15 mg tablet Commonly known as:  MOBIC  
  
 NAPROSYN 500 mg tablet Generic drug:  naproxen Take 1 tablet(s) twice a day by oral route for 30 days. * pregabalin 75 mg capsule Commonly known as:  Jesus [place] Take 1 Cap by mouth two (2) times a day. Max Daily Amount: 150 mg.  
  
 * pregabalin 75 mg capsule Commonly known as:  Jesus Leyland Take 1 Cap by mouth two (2) times a day. Max Daily Amount: 150 mg.  
  
 solifenacin 10 mg tablet Commonly known as:  Marshall Chisago Take 10 mg by mouth daily. * topiramate 25 mg tablet Commonly known as:  TOPAMAX 3 tabs PO QHS * topiramate 25 mg tablet Commonly known as:  TOPAMAX 3 tabs PO BID  
  
 traMADol 50 mg tablet Commonly known as:  ULTRAM  
  
 traZODone 100 mg tablet Commonly known as:  Teryl [place] * Notice: This list has 7 medication(s) that are the same as other medications prescribed for you. Read the directions carefully, and ask your doctor or other care provider to review them with you. Introducing [facility]! Dear Herman Ho: Thank you for requesting a [employer] account. Our records indicate that you already have an active [employer] account. You can access your account anytime at [email or web address] Did you know that you can access your hospital and ER discharge instructions at any time in [employer]? You can also review all of your test results from your hospital stay or ER visit. Additional Information If you have questions, please visit the Frequently Asked Questions section of the [employer] website at [email or web address] Remember, [employer] is NOT to be used for urgent needs. For medical emergencies, dial 911. Now available from your iPhone and Android! Please provide this summary of care documentation to your next provider. Your primary care clinician is listed as Romana Bracket. If you have any questions after today's visit, please call [phone].

## 2017-07-31 ENCOUNTER — DOCUMENTATION ONLY (OUTPATIENT)
Dept: ORTHOPEDIC SURGERY | Age: 62
End: 2017-07-31

## 2017-07-31 NOTE — PROGRESS NOTES
MRI Cervical without is scheduled at [facility], [id number], on 08/07/17, arrive 5:00PM, test 5:15PM. Pre-authorization [id number], effective 07/31/17-08/29/17

## 2017-08-22 ENCOUNTER — OFFICE VISIT (OUTPATIENT)
Dept: ORTHOPEDIC SURGERY | Age: 62
End: 2017-08-22

## 2017-08-22 VITALS
SYSTOLIC BLOOD PRESSURE: 138 MMHG | HEIGHT: 62 IN | WEIGHT: 102.8 LBS | HEART RATE: 78 BPM | DIASTOLIC BLOOD PRESSURE: 82 MMHG | RESPIRATION RATE: 16 BRPM | BODY MASS INDEX: 18.92 KG/M2

## 2017-08-22 DIAGNOSIS — M48.03 SPINAL STENOSIS, CERVICOTHORACIC REGION: Primary | ICD-10-CM

## 2017-08-22 DIAGNOSIS — M50.30 OTHER CERVICAL DISC DEGENERATION, UNSPECIFIED CERVICAL REGION: ICD-10-CM

## 2017-08-22 DIAGNOSIS — M19.041 PRIMARY OSTEOARTHRITIS, RIGHT HAND: ICD-10-CM

## 2017-08-22 DIAGNOSIS — M54.12 RADICULOPATHY, CERVICAL: ICD-10-CM

## 2017-08-22 RX ORDER — IBUPROFEN 200 MG
TABLET ORAL
COMMUNITY
End: 2018-05-21 | Stop reason: ALTCHOICE

## 2017-08-22 NOTE — MR AVS SNAPSHOT
Visit Information Date & Time Provider Department Dept. Phone Encounter #  
 8/22/2017  8:10 AM Donna Tong  [facility], [address] and [facility] - [place] [address]  Follow-up Instructions Return in about 3 months (around 11/22/2017). Upcoming Health Maintenance Date Due Hepatitis C Screening 1955 DTaP/Tdap/Td series (1 - Tdap) 12/14/1977 PAP AKA CERVICAL CYTOLOGY 12/14/1977 BREAST CANCER SCRN MAMMOGRAM 12/14/2006 FOBT Q 1 YEAR AGE 50-75 12/14/2006 ZOSTER VACCINE AGE 60> 10/14/2016 INFLUENZA AGE 9 TO ADULT 8/1/2017 Allergies as of 8/22/2017  Review Complete On: 8/22/2017 By: Donna Tong MD  
  
 Severity Noted Reaction Type Reactions Sulfa (Sulfonamide Antibiotics) High 10/28/2015    Anaphylaxis Current Immunizations  Never Reviewed No immunizations on file. Not reviewed this visit You Were Diagnosed With   
  
 Codes Comments Spinal stenosis, cervicothoracic region    -  Primary ICD-10-CM: M48.03 
ICD-9-CM: 723.0 Other cervical disc degeneration, unspecified cervical region     ICD-10-CM: M50.30 ICD-9-CM: 722.4 Vitals BP Pulse Resp Height(growth percentile) Weight(growth percentile) BMI  
 138/82 78 16 5' 2\" (1.575 m) 102 lb 12.8 oz (46.6 kg) 18.8 kg/m2 Smoking Status Never Smoker Vitals History BMI and BSA Data Body Mass Index Body Surface Area  
 18.8 kg/m 2 1.43 m 2 Preferred Pharmacy Pharmacy Name Phone [facility] [address] [facility] [phone] Your Updated Medication List  
  
   
This list is accurate as of: 8/22/17  8:40 AM.  Always use your most recent med list.  
  
  
  
  
 atorvastatin 20 mg tablet Commonly known as:  LIPITOR  
  
 citalopram 40 mg tablet Commonly known as:  CELEXA  
  
 cyclobenzaprine 5 mg tablet Commonly known as:  FLEXERIL  
  
 diclofenac 1.3 % Pt12 Commonly known as:  FLECTOR  
1 Patch by TransDERmal route every twelve (12) hours every twelve (12) hours. gabapentin 600 mg tablet Commonly known as:  NEURONTIN Take 1 Tab by mouth three (3) times daily. ibuprofen 200 mg tablet Commonly known as:  MOTRIN Take  by mouth.  
  
 meloxicam 15 mg tablet Commonly known as:  MOBIC  
  
 NAPROSYN 500 mg tablet Generic drug:  naproxen Take 1 tablet(s) twice a day by oral route for 30 days. pregabalin 75 mg capsule Commonly known as:  Beba Walker Take 1 Cap by mouth two (2) times a day. Max Daily Amount: 150 mg.  
  
 solifenacin 10 mg tablet Commonly known as:  Marga Cindy Take 10 mg by mouth daily. topiramate 25 mg tablet Commonly known as:  TOPAMAX 3 tabs PO BID  
  
 traMADol 50 mg tablet Commonly known as:  ULTRAM  
  
 traZODone 100 mg tablet Commonly known as:  Samsindy Grgavino Follow-up Instructions Return in about 3 months (around 11/22/2017). Introducing [facility]! Dear Charlotte Pinzon: Thank you for requesting a [employer] account. Our records indicate that you already have an active [employer] account. You can access your account anytime at [email or web address] Did you know that you can access your hospital and ER discharge instructions at any time in [employer]? You can also review all of your test results from your hospital stay or ER visit. Additional Information If you have questions, please visit the Frequently Asked Questions section of the [employer] website at [email or web address] Remember, [employer] is NOT to be used for urgent needs. For medical emergencies, dial 911. Now available from your iPhone and Android! Please provide this summary of care documentation to your next provider. Your primary care clinician is listed as Michelle Desouza. If you have any questions after today's visit, please call [phone].

## 2017-08-22 NOTE — PROGRESS NOTES
[facility]  [address], [place]   Phone: [phone]  Fax: [phone]        PROGRESS NOTE      HISTORY OF PRESENT ILLNESS:  The patient is a 62 y.o. female and was seen today for follow up of low back and right buttock pain extending in an S1 distribution to the knee along with a burning sensation of the right ankle. Pt also endorses neck pain radiating into the RUE in a C6/7 distribution to the hand with paraesthesias. Her LUE continues to do well. Pt reports progressive LOB and impairments in manual dexterity with the RUE. Her primary complaint currently is of first metacarpophalangeal joint pain, first digit of the RUE, which I believe is more consistent with arthritis. She reports she has seen Dr. Jolene Miguel for her right hand pain in the past who administered a cortisone injection with good relief. Pt notes her pain is the worst when she is at work or with increased activity in general. Pt has a h/o extensive spinal fusion due to h/o scoliosis > 30 years ago. She states her pain worsens as the day progresses. She reports dropping objects but states it has not gotten worse. Pt reports loss of balance and dropping things have worsened and she has noticed she tolerates more frequently. Pt failed NEURONTIN. She reports intolerance to Sameera Saint due to facial/tongue swelling and lethargy. Subsequently, she restarted Topamax 75 mg qhs with benefit. She is not a candidate for Cymbalta as she is taking Celexa and Desyrel. She has been treated with antiinflammatories, muscle relaxers, and steroids without relief. Patient performs her HEP daily. Patient denies change in bowel or bladder habits, but notes her uterus and bladder have dropped. Pt denies hx of glaucoma or DM. A RUE EMG dated 6/7/16 was within normal limits. Preliminary reading of the cervical spine plain films revealed, degenerative disc disease at C4-C5 and C5-C6. No acute pathology identified. Preliminary reading of the thoracic spine plain films revealed, moderate scoliotic curvature of the thoracic spine convex to the right at the L1 level. No acute pathology identified. Cervical spine MRI from 12/16/15 was reviewed. Per report, there was straightening with mild ligamentous laxity at C4-C5. There was degenerative joint and disc disease at C3-C4 through C7-T1. There was posterior bulging/protruding discs at C3-C4 through C7-T1 more significant at C4-C5 and C6-C7. Mild ventral cord deformity at C5-C6 with mild to moderate central canal stenosis. Mild central canal stenosis at C5-C6 and C6-C7. There was foraminal narrowing at C3-C4 through C7-T1 most significant at C5-C6 where it is moderate to severe by degree bilaterally. There was a right thyroid lesion. At her last clinical appointment, the patient was considering cervical spinal surgery as her LOB and impairments in manual dexterity had slowly progressed. I set her up for a cervical spine MRI. Concerning her right first MCP joint pain, I referred to ortho for further evaluation. I increased her Topamax 75 mg to BID. The patient returns today with neck pain extending into the RUE to the hand, involving digits 1-3 with paraesthesias. Pt describes a stabbing pain of the right hand. She rates pain 4-8/10, a slight decrease since her last visit (4-10/10). Pt has seen Dr. Judy Hoover for her right hand who administered an injection which did not provide relief. Pt reports progressive LOB and dropping objects. She  tolerates Topamax 50 mg qam and 75 mg qhs with slight additional benefit. Pt admits inconsistency with her HEP. Cervical spine MRI dated 8/7/17 reviewed. Per report ,straigetning of the cervical curvature. Multilevel degenerative joint and disc disease. Posterior disc protrusion/bulges at C3-4 through C7-T1. Spinal stenosis mild to moderate at C4-5,and mild at C5-6 and C6-7.  Multilevel foraminal narrowing secondary to uncovertebral and facet disease most significant on the right at C4-5 and bilateral at C5-6.  reviewed. Past Medical History:   Diagnosis Date    Degenerative disc disease, cervical     Psychiatric disorder     Scoliosis     Spinal stenosis in cervical region         Social History     Social History    Marital status: SINGLE     Spouse name: N/A    Number of children: N/A    Years of education: N/A     Occupational History    Not on file. Social History Main Topics    Smoking status: Never Smoker    Smokeless tobacco: Never Used    Alcohol use No    Drug use: No    Sexual activity: Not on file     Other Topics Concern    Not on file     Social History Narrative       Current Outpatient Prescriptions   Medication Sig Dispense Refill    ibuprofen (MOTRIN) 200 mg tablet Take  by mouth.  topiramate (TOPAMAX) 25 mg tablet 3 tabs PO  Tab 1    citalopram (CELEXA) 40 mg tablet       traZODone (DESYREL) 100 mg tablet       diclofenac (FLECTOR) 1.3 % pt12 1 Patch by TransDERmal route every twelve (12) hours every twelve (12) hours. 30 Patch 2    meloxicam (MOBIC) 15 mg tablet       pregabalin (LYRICA) 75 mg capsule Take 1 Cap by mouth two (2) times a day. Max Daily Amount: 150 mg. 14 Cap 0    solifenacin (VESICARE) 10 mg tablet Take 10 mg by mouth daily.  gabapentin (NEURONTIN) 600 mg tablet Take 1 Tab by mouth three (3) times daily. 90 Tab 2    naproxen (NAPROSYN) 500 mg tablet Take 1 tablet(s) twice a day by oral route for 30 days.       atorvastatin (LIPITOR) 20 mg tablet       cyclobenzaprine (FLEXERIL) 5 mg tablet       traMADol (ULTRAM) 50 mg tablet          Allergies   Allergen Reactions    Sulfa (Sulfonamide Antibiotics) Anaphylaxis          PHYSICAL EXAMINATION    Visit Vitals    /82    Pulse 78    Resp 16    Ht 5' 2\" (1.575 m)    Wt 102 lb 12.8 oz (46.6 kg)    BMI 18.8 kg/m2       CONSTITUTIONAL: NAD, A&O x 3  SENSATION: Decreased sensation to light touch at all digits of the RUE. Sensation to light touch otherwise intact. NEURO: Edin's is negative bilaterally. RANGE OF MOTION: The patient has full passive range of motion in all four extremities. MOTOR:  Tandem Gait: Neg. Shoulder AB/Flex Elbow Flex Wrist Ext Elbow Ext Wrist Flex Hand Intrin Tone   Right +4/5 +4/5 +4/5 +4/5 +4/5 +4/5 +4/5   Left +4/5 +4/5 +4/5 +4/5 +4/5 +4/5 +4/5                 ASSESSMENT   Diagnoses and all orders for this visit:    1. Spinal stenosis, cervicothoracic region    2. Other cervical disc degeneration, unspecified cervical region    3. Radiculopathy, cervical    4. Primary osteoarthritis, right hand          IMPRESSION AND PLAN:  She is not interested in surgical intervention regarding her cervical spine. Patient would like to decrease her Topamax back to 3 tabs qhs as the increased dose did not offer significant additional relief. She has been given instructions on how to decrease her Topamax. I recommend she increase the frequency of HEP to daily. I will see the patient back in 3 month's time or earlier if needed. Written by Lucy Rangel, as dictated by Mariza Tobias MD  I examined the patient, reviewed and agree with the note.

## 2017-09-01 DIAGNOSIS — M19.041 OSTEOARTHRITIS OF RIGHT HAND, UNSPECIFIED OSTEOARTHRITIS TYPE: ICD-10-CM

## 2017-09-01 DIAGNOSIS — M48.03 SPINAL STENOSIS, CERVICOTHORACIC REGION: ICD-10-CM

## 2017-09-01 DIAGNOSIS — M50.30 OTHER CERVICAL DISC DEGENERATION, UNSPECIFIED CERVICAL REGION: ICD-10-CM

## 2017-09-01 DIAGNOSIS — M54.12 RADICULOPATHY, CERVICAL: ICD-10-CM

## 2017-11-20 ENCOUNTER — OFFICE VISIT (OUTPATIENT)
Dept: ORTHOPEDIC SURGERY | Age: 62
End: 2017-11-20

## 2017-11-20 VITALS
HEART RATE: 83 BPM | DIASTOLIC BLOOD PRESSURE: 60 MMHG | HEIGHT: 62 IN | SYSTOLIC BLOOD PRESSURE: 136 MMHG | WEIGHT: 102.8 LBS | BODY MASS INDEX: 18.92 KG/M2 | RESPIRATION RATE: 14 BRPM

## 2017-11-20 DIAGNOSIS — M54.12 RADICULOPATHY, CERVICAL REGION: ICD-10-CM

## 2017-11-20 DIAGNOSIS — M19.041 PRIMARY OSTEOARTHRITIS, RIGHT HAND: ICD-10-CM

## 2017-11-20 DIAGNOSIS — M50.30 OTHER CERVICAL DISC DEGENERATION, UNSPECIFIED CERVICAL REGION: ICD-10-CM

## 2017-11-20 DIAGNOSIS — M48.02 CERVICAL SPINAL STENOSIS: Primary | ICD-10-CM

## 2017-11-20 RX ORDER — DULOXETIN HYDROCHLORIDE 60 MG/1
CAPSULE, DELAYED RELEASE ORAL
COMMUNITY
Start: 2017-10-19 | End: 2018-05-21 | Stop reason: ALTCHOICE

## 2017-11-20 RX ORDER — TOPIRAMATE 25 MG/1
TABLET ORAL
Qty: 270 TAB | Refills: 1 | Status: SHIPPED | OUTPATIENT
Start: 2017-11-20 | End: 2020-09-21 | Stop reason: SDUPTHER

## 2017-11-20 NOTE — PROGRESS NOTES
[facility]  [address], [place]   Phone: [phone]  Fax: [phone]        PROGRESS NOTE      HISTORY OF PRESENT ILLNESS:  The patient is a 62 y.o. female and was seen today for follow up of low back and right buttock pain extending into the RLE in an S1 distribution to the knee along with a burning sensation in the right ankle. Pt also endorses neck pain radiating into the RUE in a C6/7 distribution to the hand with paraesthesias. Pt describes a stabbing pain of the right hand. Her LUE continues to do well. Pt reports progressive LOB and impairments in manual dexterity with the RUE. Her primary complaint currently is of first metacarpophalangeal joint pain, first digit of the RUE, which I believe is more consistent with arthritis. She reports she has seen Dr. Milagros Del Cid for her right hand pain in the past who administered a cortisone injection with good relief. Pt has seen Dr. Lucho Nick for her right hand who administered an injection which did not provide relief. Pt notes her pain is the worst when she is at work or with increased activity in general. Pt has a h/o extensive spinal fusion due to h/o scoliosis > 30 years ago. She states her pain worsens as the day progresses. She reports dropping objects but states it has not gotten worse. Pt reports loss of balance and dropping things have worsened and she has noticed she tolerates more frequently. Pt failed NEURONTIN. She reports intolerance to Judith Brenham due to facial/tongue swelling and lethargy. Pt has been treated with antiinflammatories, muscle relaxers, and steroids without relief. Patient performs her HEP daily. Patient denies change in bowel or bladder habits, but notes her uterus and bladder have dropped. Pt denies hx of glaucoma or DM. A RUE EMG dated 6/7/16 was within normal limits. Preliminary reading of the cervical spine plain films revealed, degenerative disc disease at C4-C5 and C5-C6.  No acute pathology identified. Preliminary reading of the thoracic spine plain films revealed, moderate scoliotic curvature of the thoracic spine convex to the right at the L1 level. No acute pathology identified. Cervical spine MRI dated 8/7/17 reviewed. Per report, straightening of the cervical curvature. Multilevel degenerative joint and disc disease. Posterior disc protrusion/bulges at C3-4 through C7-T1. Spinal stenosis mild to moderate at C4-5,and mild at C5-6 and C6-7. Multilevel foraminal narrowing secondary to uncovertebral and facet disease most significant on the right at C4-5 and bilateral at C5-6. At her last clinical appointment, she was not interested in surgical intervention regarding her cervical spine. Patient wished to decrease her Topamax back to 3 tabs qhs as the increased dose did not offer significant additional relief. She was given instructions on how to decrease her Topamax. I recommended she increase the frequency of HEP to daily. The patient returns today with pain location and distribution remain unchanged. She rates pain 3-4.5/10, an improvement since her last visit (4-8/10). Pt decreased her Topamax to 75 mg qhs. Noted, she tolerated Topamax 50 mg qam and 75 mg qhs without additional relief. She reports Dr. Geo Solis, PCP discontinued her Celexa and started her on Cymbalta 60 mg, which she tolerates well. Pt admits completing her HEP every other day. She denies LOB or dropping objects.  reviewed. Past Medical History:   Diagnosis Date    Degenerative disc disease, cervical     Psychiatric disorder     Scoliosis     Spinal stenosis in cervical region         Social History     Social History    Marital status: SINGLE     Spouse name: N/A    Number of children: N/A    Years of education: N/A     Occupational History    Not on file. Social History Main Topics    Smoking status: Never Smoker    Smokeless tobacco: Never Used    Alcohol use No    Drug use:  No  Sexual activity: Not on file     Other Topics Concern    Not on file     Social History Narrative       Current Outpatient Prescriptions   Medication Sig Dispense Refill    DULoxetine (CYMBALTA) 60 mg capsule       topiramate (TOPAMAX) 25 mg tablet 3 tabs PO  Tab 1    topiramate (TOPAMAX) 25 mg tablet 3 tabs PO  Tab 1    atorvastatin (LIPITOR) 20 mg tablet       traZODone (DESYREL) 100 mg tablet       ibuprofen (MOTRIN) 200 mg tablet Take  by mouth.  diclofenac (FLECTOR) 1.3 % pt12 1 Patch by TransDERmal route every twelve (12) hours every twelve (12) hours. 30 Patch 2    meloxicam (MOBIC) 15 mg tablet       pregabalin (LYRICA) 75 mg capsule Take 1 Cap by mouth two (2) times a day. Max Daily Amount: 150 mg. 14 Cap 0    solifenacin (VESICARE) 10 mg tablet Take 10 mg by mouth daily.  gabapentin (NEURONTIN) 600 mg tablet Take 1 Tab by mouth three (3) times daily. 90 Tab 2    naproxen (NAPROSYN) 500 mg tablet Take 1 tablet(s) twice a day by oral route for 30 days.  citalopram (CELEXA) 40 mg tablet       cyclobenzaprine (FLEXERIL) 5 mg tablet       traMADol (ULTRAM) 50 mg tablet          Allergies   Allergen Reactions    Sulfa (Sulfonamide Antibiotics) Anaphylaxis          PHYSICAL EXAMINATION    Visit Vitals    /60    Pulse 83    Resp 14    Ht 5' 2\" (1.575 m)    Wt 102 lb 12.8 oz (46.6 kg)    BMI 18.8 kg/m2       CONSTITUTIONAL: NAD, A&O x 3  SENSATION: Intact to light touch throughout  NEURO: Edin's is negative bilaterally. RANGE OF MOTION: The patient has full passive range of motion in all four extremities. Shoulder AB/Flex Elbow Flex Wrist Ext Elbow Ext Wrist Flex Hand Intrin Tone   Right +4/5 +4/5 +4/5 +4/5 +4/5 +4/5 +4/5   Left +4/5 +4/5 +4/5 +4/5 +4/5 +4/5 +4/5                 ASSESSMENT   Diagnoses and all orders for this visit:    1. Cervical spinal stenosis    2. Radiculopathy, cervical region    3.  Other cervical disc degeneration, unspecified cervical region    4. Primary osteoarthritis, right hand    Other orders  -     topiramate (TOPAMAX) 25 mg tablet; 3 tabs PO QHS          IMPRESSION AND PLAN:  Patient wished to continue her current treatment. She was provided with refills of her Topamax 75 mg qhs. I recommend she increase the frequency of HEP to daily. I will see the patient back in 6 month's time or earlier if needed. Written by Christy Vallejo, as dictated by Ro Rand MD  I examined the patient, reviewed and agree with the note.

## 2017-11-20 NOTE — MR AVS SNAPSHOT
Visit Information Date & Time Provider Department Dept. Phone Encounter #  
 11/20/2017  8:30 AM Farhana Fontaine, [address] [facility] - [place] [phone] [id number] Follow-up Instructions Return in about 6 months (around 5/20/2018). Upcoming Health Maintenance Date Due Hepatitis C Screening 1955 DTaP/Tdap/Td series (1 - Tdap) 12/14/1977 PAP AKA CERVICAL CYTOLOGY 12/14/1977 BREAST CANCER SCRN MAMMOGRAM 12/14/2006 FOBT Q 1 YEAR AGE 50-75 12/14/2006 ZOSTER VACCINE AGE 60> 10/14/2016 Influenza Age 5 to Adult 8/1/2017 Allergies as of 11/20/2017  Review Complete On: 11/20/2017 By: Farhana Fontaine MD  
  
 Severity Noted Reaction Type Reactions Sulfa (Sulfonamide Antibiotics) High 10/28/2015    Anaphylaxis Current Immunizations  Never Reviewed No immunizations on file. Not reviewed this visit You Were Diagnosed With   
  
 Codes Comments Cervical spinal stenosis    -  Primary ICD-10-CM: M48.02 
ICD-9-CM: 723.0 Radiculopathy, cervical region     ICD-10-CM: M54.12 
ICD-9-CM: 723.4 Other cervical disc degeneration, unspecified cervical region     ICD-10-CM: M50.30 ICD-9-CM: 722.4 Primary osteoarthritis, right hand     ICD-10-CM: M19.041 ICD-9-CM: 715.14 Vitals BP Pulse Resp Height(growth percentile) Weight(growth percentile) BMI  
 136/60 83 14 5' 2\" (1.575 m) 102 lb 12.8 oz (46.6 kg) 18.8 kg/m2 Smoking Status Never Smoker BMI and BSA Data Body Mass Index Body Surface Area  
 18.8 kg/m 2 1.43 m 2 Preferred Pharmacy Pharmacy Name Phone [facility] [address] [place] [phone] Your Updated Medication List  
  
   
This list is accurate as of: 11/20/17  8:47 AM.  Always use your most recent med list.  
  
  
  
  
 atorvastatin 20 mg tablet Commonly known as:  LIPITOR citalopram 40 mg tablet Commonly known as:  CELEXA  
  
 cyclobenzaprine 5 mg tablet Commonly known as:  FLEXERIL  
  
 diclofenac 1.3 % Pt12 Commonly known as:  FLECTOR  
1 Patch by TransDERmal route every twelve (12) hours every twelve (12) hours. DULoxetine 60 mg capsule Commonly known as:  CYMBALTA  
  
 gabapentin 600 mg tablet Commonly known as:  NEURONTIN Take 1 Tab by mouth three (3) times daily. ibuprofen 200 mg tablet Commonly known as:  MOTRIN Take  by mouth.  
  
 meloxicam 15 mg tablet Commonly known as:  MOBIC  
  
 NAPROSYN 500 mg tablet Generic drug:  naproxen Take 1 tablet(s) twice a day by oral route for 30 days. pregabalin 75 mg capsule Commonly known as:  Javy Barks Take 1 Cap by mouth two (2) times a day. Max Daily Amount: 150 mg.  
  
 solifenacin 10 mg tablet Commonly known as:  Dorice Pines Take 10 mg by mouth daily. * topiramate 25 mg tablet Commonly known as:  TOPAMAX 3 tabs PO BID * topiramate 25 mg tablet Commonly known as:  TOPAMAX 3 tabs PO QHS  
  
 traMADol 50 mg tablet Commonly known as:  ULTRAM  
  
 traZODone 100 mg tablet Commonly known as:  Ranulfo Dundas * Notice: This list has 2 medication(s) that are the same as other medications prescribed for you. Read the directions carefully, and ask your doctor or other care provider to review them with you. Prescriptions Sent to Pharmacy Refills  
 topiramate (TOPAMAX) 25 mg tablet 1 Sig: 3 tabs PO QHS Class: Normal  
 Pharmacy: [address] #: [phone] Follow-up Instructions Return in about 6 months (around 5/20/2018). Introducing [facility]! Dear Veronica Williamson: Thank you for requesting a [employer] account. Our records indicate that you already have an active [employer] account. You can access your account anytime at [email or web address] Did you know that you can access your hospital and ER discharge instructions at any time in [employer]? You can also review all of your test results from your hospital stay or ER visit. Additional Information If you have questions, please visit the Frequently Asked Questions section of the [employer] website at [email or web address] Remember, [employer] is NOT to be used for urgent needs. For medical emergencies, dial 911. Now available from your iPhone and Android! Please provide this summary of care documentation to your next provider. Your primary care clinician is listed as Nader Arguello. If you have any questions after today's visit, please call [phone].

## 2018-05-21 ENCOUNTER — OFFICE VISIT (OUTPATIENT)
Dept: ORTHOPEDIC SURGERY | Age: 63
End: 2018-05-21

## 2018-05-21 VITALS
HEIGHT: 62 IN | BODY MASS INDEX: 19.4 KG/M2 | HEART RATE: 75 BPM | OXYGEN SATURATION: 99 % | DIASTOLIC BLOOD PRESSURE: 43 MMHG | TEMPERATURE: 97.6 F | WEIGHT: 105.4 LBS | SYSTOLIC BLOOD PRESSURE: 119 MMHG | RESPIRATION RATE: 14 BRPM

## 2018-05-21 DIAGNOSIS — M54.12 RADICULOPATHY, CERVICAL REGION: ICD-10-CM

## 2018-05-21 DIAGNOSIS — M48.02 CERVICAL SPINAL STENOSIS: Primary | ICD-10-CM

## 2018-05-21 DIAGNOSIS — M50.30 OTHER CERVICAL DISC DEGENERATION, UNSPECIFIED CERVICAL REGION: ICD-10-CM

## 2018-05-21 DIAGNOSIS — M54.5 CHRONIC LOW BACK PAIN, UNSPECIFIED BACK PAIN LATERALITY, WITH SCIATICA PRESENCE UNSPECIFIED: ICD-10-CM

## 2018-05-21 DIAGNOSIS — G89.29 CHRONIC LOW BACK PAIN, UNSPECIFIED BACK PAIN LATERALITY, WITH SCIATICA PRESENCE UNSPECIFIED: ICD-10-CM

## 2018-05-21 RX ORDER — METHYLPREDNISOLONE 4 MG/1
TABLET ORAL
Qty: 1 DOSE PACK | Refills: 0 | Status: SHIPPED | OUTPATIENT
Start: 2018-05-21 | End: 2020-09-21 | Stop reason: SDUPTHER

## 2018-05-21 RX ORDER — NAPROXEN 500 MG/1
500 TABLET ORAL 2 TIMES DAILY WITH MEALS
Qty: 30 TAB | Refills: 0 | Status: SHIPPED | OUTPATIENT
Start: 2018-05-21 | End: 2020-09-21 | Stop reason: SDUPTHER

## 2018-05-21 RX ORDER — TOPIRAMATE 25 MG/1
TABLET ORAL
Qty: 90 TAB | Refills: 5 | Status: SHIPPED | OUTPATIENT
Start: 2018-05-21 | End: 2020-09-21 | Stop reason: SDUPTHER

## 2018-05-21 NOTE — PROGRESS NOTES
[facility]  [address], [place]   Phone: [phone]  Fax: [phone]        PROGRESS NOTE      HISTORY OF PRESENT ILLNESS:  The patient is a 65 y.o. female and was seen today for follow up of low back and right buttock pain extending into the RLE in an S1 distribution to the knee along with a burning sensation in the right ankle. Pt also endorses neck pain radiating into the RUE in a C6/7 distribution to the hand with paraesthesias. Pt describes a stabbing pain of the right hand. Her LUE continues to do well. Pt reports infrequent LOB and impairments in manual dexterity with the RUE. I previously felt her impairments in manual dexterity of the RUE were more consistent with arthritis. She reports she has seen Dr. Angel Kiran for her right hand pain in the past who administered a cortisone injection with good relief. Pt has seen Dr. Falguni Gabriel for her right hand who administered an injection which did not provide relief. Pt notes her pain is the worst when she is at work or with increased activity in general. Pt has a h/o extensive spinal fusion due to h/o scoliosis > 30 years ago. Pt failed NEURONTIN. She reports intolerance to [facility] due to facial/tongue swelling and lethargy. She reports Dr. Cristina Wills, PCP discontinued her Celexa and started her on Cymbalta 60 mg, which she tolerates well. Noted, she tolerated Topamax 50 mg qam and 75 mg qhs without additional relief. Pt has been treated with antiinflammatories, muscle relaxers, and steroids without relief. Patient performs her HEP daily. Patient denies change in bowel or bladder habits, but notes her uterus and bladder have dropped. Pt denies hx of glaucoma or DM. A RUE EMG dated 6/7/16 was within normal limits. Preliminary reading of the cervical spine plain films revealed, degenerative disc disease at C4-C5 and C5-C6. No acute pathology identified.  Preliminary reading of the thoracic spine plain films revealed, moderate scoliotic curvature of the thoracic spine convex to the right at the L1 level. No acute pathology identified. Cervical spine MRI dated 8/7/17 reviewed. Per report, straightening of the cervical curvature. Multilevel degenerative joint and disc disease. Posterior disc protrusion/bulges at C3-4 through C7-T1. Spinal stenosis mild to moderate at C4-5,and mild at C5-6 and C6-7. Multilevel foraminal narrowing secondary to uncovertebral and facet disease most significant on the right at C4-5 and bilateral at C5-6. At her last clinical appointment, patient wished to continue her current treatment. She was provided with refills of her Topamax 75 mg qhs. I recommended she increase the frequency of HEP to daily. The patient returns today with neck pain extending into the bilateral shoulders and into the RUE to the hand, involving all digits. Pt continues to have low back and right buttocks pain extending into the RLE. She rates pain 10/10, elevated since her last visit (3-4.5/10). Her neck/shoulder and RUE pain is the greatest in severity. Pt discontinued Cymbalta as it was beneficial for her depression. She is compliant with Topamax 75 mg qhs. Pt admits completing her HEP every other day.  reviewed. Body mass index is 19.28 kg/(m^2). PCP: Khadijah Hoover MD      Past Medical History:   Diagnosis Date    Degenerative disc disease, cervical     Psychiatric disorder     Scoliosis     Spinal stenosis in cervical region         Social History     Social History    Marital status: SINGLE     Spouse name: N/A    Number of children: N/A    Years of education: N/A     Occupational History    Not on file.      Social History Main Topics    Smoking status: Former Smoker     Quit date: 2013    Smokeless tobacco: Never Used    Alcohol use No    Drug use: No    Sexual activity: Not on file     Other Topics Concern    Not on file     Social History Narrative       Current Outpatient Prescriptions   Medication Sig Dispense Refill    topiramate (TOPAMAX) 25 mg tablet 3 tabs PO QHS 90 Tab 5    methylPREDNISolone (MEDROL DOSEPACK) 4 mg tablet Per dose pack instructions 1 Dose Pack 0    naproxen (NAPROSYN) 500 mg tablet Take 1 Tab by mouth two (2) times daily (with meals). 30 Tab 0    topiramate (TOPAMAX) 25 mg tablet 3 tabs PO  Tab 1    atorvastatin (LIPITOR) 20 mg tablet Take 20 mg by mouth daily.  citalopram (CELEXA) 40 mg tablet Take 40 mg by mouth daily.  traZODone (DESYREL) 100 mg tablet Take 100 mg by mouth nightly.  diclofenac (FLECTOR) 1.3 % pt12 1 Patch by TransDERmal route every twelve (12) hours every twelve (12) hours. 30 Patch 2       Allergies   Allergen Reactions    Sulfa (Sulfonamide Antibiotics) Anaphylaxis          PHYSICAL EXAMINATION    Visit Vitals    /43    Pulse 75    Temp 97.6 °F (36.4 °C) (Oral)    Resp 14    Ht 5' 2\" (1.575 m)    Wt 47.8 kg (105 lb 6.4 oz)    SpO2 99%    BMI 19.28 kg/m2       CONSTITUTIONAL: NAD, A&O x 3  SENSATION: Increased tenderness to palpation of the first metacarpophalangeal joint on the right. Intact to light touch throughout  NEURO: Edin's is negative bilaterally. RANGE OF MOTION: The patient has full passive range of motion in all four extremities. MOTOR:  Straight Leg Raise: Negative, bilateral     Shoulder AB/Flex Elbow Flex Wrist Ext Elbow Ext Wrist Flex Hand Intrin Tone   Right +4/5 +4/5 +4/5 +4/5 +4/5 +4/5 +4/5   Left +4/5 +4/5 +4/5 +4/5 +4/5 +4/5 +4/5              Hip Flex Knee Ext Knee Flex Ankle DF GTE Ankle PF Tone   Right +4/5 +4/5 +4/5 +4/5 +4/5 +4/5 +4/5   Left +4/5 +4/5 +4/5 +4/5 +4/5 +4/5 +4/5       ASSESSMENT   Diagnoses and all orders for this visit:    1. Cervical spinal stenosis    2. Other cervical disc degeneration, unspecified cervical region    3. Radiculopathy, cervical region    4.  Chronic low back pain, unspecified back pain laterality, with sciatica presence unspecified    Other orders  -     topiramate (TOPAMAX) 25 mg tablet; 3 tabs PO QHS  -     methylPREDNISolone (MEDROL DOSEPACK) 4 mg tablet; Per dose pack instructions  -     naproxen (NAPROSYN) 500 mg tablet; Take 1 Tab by mouth two (2) times daily (with meals). IMPRESSION AND PLAN:  I did offer to try her Gabitril which she declined. She is not interested in surgical intervention at this time. I also did discuss the option of pain management which she declined. She was provided with refills of her Topamax 75 mg qhs. I will start her on Medrol Dosepak. I gave her a prescription for Naproxen following completion of the Medrol Dosepak. I recommend she increase the frequency of HEP to daily. I will see the patient back in 1 month's time or earlier if needed. Written by Leonila Mecrado, as dictated by Helena Nair MD  I examined the patient, reviewed and agree with the note.

## 2018-05-21 NOTE — MR AVS SNAPSHOT
[address] [facility], [place] 
 
 
 [address] [facility] 
[phone] Patient: Donta Hamm MRN: [id number] MDY:[id number] Visit Information Date & Time Provider Department Dept. Phone Encounter #  
 5/21/2018  8:30 AM Maria G Galvin MD [facility] [place] [phone] [id number] Follow-up Instructions Return in about 4 weeks (around 6/18/2018). Upcoming Health Maintenance Date Due Hepatitis C Screening 1955 DTaP/Tdap/Td series (1 - Tdap) 12/14/1977 PAP AKA CERVICAL CYTOLOGY 12/14/1977 BREAST CANCER SCRN MAMMOGRAM 12/14/2006 FOBT Q 1 YEAR AGE 50-75 12/14/2006 ZOSTER VACCINE AGE 60> 10/14/2016 Influenza Age 5 to Adult 8/1/2018 Allergies as of 5/21/2018  Review Complete On: 5/21/2018 By: Maria G Galvin MD  
  
 Severity Noted Reaction Type Reactions Sulfa (Sulfonamide Antibiotics) High 10/28/2015    Anaphylaxis Current Immunizations  Never Reviewed No immunizations on file. Not reviewed this visit You Were Diagnosed With   
  
 Codes Comments Cervical spinal stenosis    -  Primary ICD-10-CM: M48.02 
ICD-9-CM: 723.0 Other cervical disc degeneration, unspecified cervical region     ICD-10-CM: M50.30 ICD-9-CM: 722.4 Radiculopathy, cervical region     ICD-10-CM: M54.12 
ICD-9-CM: 723.4 Chronic low back pain, unspecified back pain laterality, with sciatica presence unspecified     ICD-10-CM: M54.5, G89.29 ICD-9-CM: 724.2, 338.29 Vitals BP Pulse Temp Resp Height(growth percentile) Weight(growth percentile) 119/43 75 97.6 °F (36.4 °C) (Oral) 14 5' 2\" (1.575 m) 105 lb 6.4 oz (47.8 kg) SpO2 BMI Smoking Status 99% 19.28 kg/m2 Former Smoker BMI and BSA Data Body Mass Index Body Surface Area  
 19.28 kg/m 2 1.45 m 2 Preferred Pharmacy Pharmacy Name Phone [address] [facility] [phone] Your Updated Medication List  
  
   
This list is accurate as of 5/21/18  9:13 AM.  Always use your most recent med list.  
  
  
  
  
 atorvastatin 20 mg tablet Commonly known as:  LIPITOR Take 20 mg by mouth daily. citalopram 40 mg tablet Commonly known as:  Josephine Penning Take 40 mg by mouth daily. diclofenac 1.3 % Pt12 Commonly known as:  FLECTOR  
1 Patch by TransDERmal route every twelve (12) hours every twelve (12) hours. methylPREDNISolone 4 mg tablet Commonly known as:  Aakash Sinks Per dose pack instructions  
  
 naproxen 500 mg tablet Commonly known as:  NAPROSYN Take 1 Tab by mouth two (2) times daily (with meals). * topiramate 25 mg tablet Commonly known as:  TOPAMAX 3 tabs PO QHS * topiramate 25 mg tablet Commonly known as:  TOPAMAX 3 tabs PO QHS  
  
 traZODone 100 mg tablet Commonly known as:  Noreene Tavon Take 100 mg by mouth nightly. * Notice: This list has 2 medication(s) that are the same as other medications prescribed for you. Read the directions carefully, and ask your doctor or other care provider to review them with you. Prescriptions Sent to Pharmacy Refills  
 topiramate (TOPAMAX) 25 mg tablet 5 Sig: 3 tabs PO QHS Class: Normal  
 Pharmacy: [facility] [address] [place] [address] Ph #: [phone]  
 methylPREDNISolone (MEDROL DOSEPACK) 4 mg tablet 0 Sig: Per dose pack instructions Class: Normal  
 Pharmacy: [facility] [address] [facility] Ph #: [phone]  
 naproxen (NAPROSYN) 500 mg tablet 0 Sig: Take 1 Tab by mouth two (2) times daily (with meals). Class: Normal  
 Pharmacy: [facility] [address], [address] [facility] Ph #: [phone] Route: Oral  
  
Follow-up Instructions Return in about 4 weeks (around 6/18/2018). Introducing [facility]! Dear Yazmin Mayberry: Thank you for requesting a [employer] account. Our records indicate that you already have an active [employer] account. You can access your account anytime at [email or web address] Did you know that you can access your hospital and ER discharge instructions at any time in [employer]? You can also review all of your test results from your hospital stay or ER visit. Additional Information If you have questions, please visit the Frequently Asked Questions section of the [employer] website at [email or web address] Remember, [employer] is NOT to be used for urgent needs. For medical emergencies, dial 911. Now available from your iPhone and Android! Please provide this summary of care documentation to your next provider. Your primary care clinician is listed as Claudia Lopez. If you have any questions after today's visit, please call [phone].

## 2018-06-25 ENCOUNTER — OFFICE VISIT (OUTPATIENT)
Dept: ORTHOPEDIC SURGERY | Age: 63
End: 2018-06-25

## 2018-06-25 VITALS
SYSTOLIC BLOOD PRESSURE: 126 MMHG | BODY MASS INDEX: 18.77 KG/M2 | WEIGHT: 102 LBS | HEIGHT: 62 IN | TEMPERATURE: 97.2 F | OXYGEN SATURATION: 98 % | DIASTOLIC BLOOD PRESSURE: 52 MMHG | HEART RATE: 67 BPM

## 2018-06-25 DIAGNOSIS — M48.02 CERVICAL SPINAL STENOSIS: Primary | ICD-10-CM

## 2018-06-25 DIAGNOSIS — M54.12 RADICULOPATHY, CERVICAL REGION: ICD-10-CM

## 2018-06-25 DIAGNOSIS — M50.30 DDD (DEGENERATIVE DISC DISEASE), CERVICAL: ICD-10-CM

## 2018-06-25 DIAGNOSIS — M54.5 LOW BACK PAIN, UNSPECIFIED BACK PAIN LATERALITY, UNSPECIFIED CHRONICITY, WITH SCIATICA PRESENCE UNSPECIFIED: ICD-10-CM

## 2018-06-25 RX ORDER — TIAGABINE HYDROCHLORIDE 4 MG/1
4 TABLET, FILM COATED ORAL 2 TIMES DAILY WITH MEALS
Qty: 60 TAB | Refills: 1 | Status: SHIPPED | OUTPATIENT
Start: 2018-06-25 | End: 2018-07-23

## 2018-06-25 NOTE — PROGRESS NOTES
[facility]  [address], [place]   Phone: [phone]  Fax: [phone]        PROGRESS NOTE      HISTORY OF PRESENT ILLNESS:  The patient is a 65 y.o. female and was seen today for follow up of neck pain extending into the bilateral shoulders and into the RUE to the hand, involving all digits. Pt describes a stabbing pain of the right hand. Her LUE continues to do well. Pt reports infrequent LOB and impairments in manual dexterity with the RUE. I previously felt her impairments in manual dexterity of the RUE were more consistent with arthritis. Pt continues to have low back and right buttocks pain extending into the RLE in an S1 distribution to the knee along with a burning sensation in the right ankle. She reports she has seen Dr. Gordo Amezquita for her right hand pain in the past who administered a cortisone injection with good relief. Pt has seen Dr. Mustapha Baugh for her right hand who administered an injection which did not provide relief. Pt notes her pain is the worst when she is at work or with increased activity in general. Pt has a h/o extensive spinal fusion due to h/o scoliosis > 30 years ago. Pt failed NEURONTIN. She reports intolerance to [facility] due to facial/tongue swelling and lethargy. Pt discontinued Cymbalta as it was not beneficial for her depression. Noted, she tolerated Topamax 50 mg qam and 75 mg qhs without additional relief. Pt has been treated with antiinflammatories, muscle relaxers, and steroids without relief. Patient performs her HEP daily. Patient denies change in bowel or bladder habits, but notes her uterus and bladder have dropped. Pt denies hx of glaucoma or DM. A RUE EMG dated 6/7/16 was within normal limits. Preliminary reading of the cervical spine plain films revealed, degenerative disc disease at C4-C5 and C5-C6. No acute pathology identified.  Preliminary reading of the thoracic spine plain films revealed, moderate scoliotic curvature of the thoracic spine convex to the right at the L1 level. No acute pathology identified. Cervical spine MRI dated 8/7/17 reviewed. Per report, straightening of the cervical curvature. Multilevel degenerative joint and disc disease. Posterior disc protrusion/bulges at C3-4 through C7-T1. Spinal stenosis mild to moderate at C4-5,and mild at C5-6 and C6-7. Multilevel foraminal narrowing secondary to uncovertebral and facet disease most significant on the right at C4-5 and bilateral at C5-6. At her last clinical appointment, I offered to try her Gabitril which she declined. She was not interested in surgical intervention at that time. I discussed the option of pain management which she also declined. She was provided with refills of her Topamax 75 mg qhs. I started her on Medrol Dosepak. I gave her a prescription for Naproxen following completion of the Medrol Dosepak. I recommended she increase the frequency of HEP to daily. The patient returns today with pain location and distribution remain unchanged. She rates her pain 10/10, consistent with her last visit (10/10). She completed MDP with significant but transient relief. Her neck/shoulder and RUE pain is the greatest in severity. She is compliant with Topamax 75 mg qhs. Pt admits completing her HEP every day. She reports a worsening loss of balance and stumbling recently. Pt is self treating with Tylenol with some relief.  reviewed. Body mass index is 18.66 kg/(m^2). PCP: Chava Eric MD      Past Medical History:   Diagnosis Date    Degenerative disc disease, cervical     Psychiatric disorder     Scoliosis     Spinal stenosis in cervical region         Social History     Social History    Marital status: SINGLE     Spouse name: N/A    Number of children: N/A    Years of education: N/A     Occupational History    Not on file.      Social History Main Topics    Smoking status: Former Smoker     Quit date: 2013    Smokeless tobacco: Never Used    Alcohol use No    Drug use: No    Sexual activity: Not on file     Other Topics Concern    Not on file     Social History Narrative       Current Outpatient Prescriptions   Medication Sig Dispense Refill    tiaGABine (GABITRIL) 4 mg tablet Take 1 Tab by mouth two (2) times daily (with meals). 60 Tab 1    topiramate (TOPAMAX) 25 mg tablet 3 tabs PO QHS 90 Tab 5    topiramate (TOPAMAX) 25 mg tablet 3 tabs PO  Tab 1    atorvastatin (LIPITOR) 20 mg tablet Take 20 mg by mouth daily.  citalopram (CELEXA) 40 mg tablet Take 40 mg by mouth daily.  traZODone (DESYREL) 100 mg tablet Take 100 mg by mouth nightly.  methylPREDNISolone (MEDROL DOSEPACK) 4 mg tablet Per dose pack instructions 1 Dose Pack 0    naproxen (NAPROSYN) 500 mg tablet Take 1 Tab by mouth two (2) times daily (with meals). 30 Tab 0    diclofenac (FLECTOR) 1.3 % pt12 1 Patch by TransDERmal route every twelve (12) hours every twelve (12) hours. 30 Patch 2       Allergies   Allergen Reactions    Sulfa (Sulfonamide Antibiotics) Anaphylaxis          PHYSICAL EXAMINATION    Visit Vitals    /52    Pulse 67    Temp 97.2 °F (36.2 °C) (Oral)    Ht 5' 2\" (1.575 m)    Wt 46.3 kg (102 lb)    SpO2 98%    BMI 18.66 kg/m2       CONSTITUTIONAL: NAD, A&O x 3  SENSATION: Decreased sensation to light touch all digits of the RUE. Sensation to light touch otherwise intact. NEURO: Edin's is negative bilaterally. RANGE OF MOTION: The patient has full passive range of motion in all four extremities. Shoulder AB/Flex Elbow Flex Wrist Ext Elbow Ext Wrist Flex Hand Intrin Tone   Right +4/5 +4/5 +4/5 +4/5 +4/5 +4/5 +4/5   Left +4/5 +4/5 +4/5 +4/5 +4/5 +4/5 +4/5                 ASSESSMENT   Diagnoses and all orders for this visit:    1. Cervical spinal stenosis    2. DDD (degenerative disc disease), cervical    3. Radiculopathy, cervical region    4.  Low back pain, unspecified back pain laterality, unspecified chronicity, with sciatica presence unspecified    Other orders  -     tiaGABine (GABITRIL) 4 mg tablet; Take 1 Tab by mouth two (2) times daily (with meals). IMPRESSION AND PLAN:  Clinically, I did not see signs of worsening of LOB or weakness during the physical examination. Patient is neurologically intact. Patient was not interested in surgical intervention or a referral to pain management at this time. Patient should wean off of Topamax 75 mg qhs. I will try her on Gabitril 4 mg BID. The risks, benefits, and potential side effects of this medication were discussed. Patient understands and wishes to proceed. Patient advised to call the office if intolerant to new medication. I recommend she continue completing her HEP daily. I will see the patient back in 1 month's time or earlier if needed. Written by Misael Olmos, as dictated by Marlene Stanford MD  I examined the patient, reviewed and agree with the note.

## 2018-06-25 NOTE — MR AVS SNAPSHOT
[address] [facility] 
 
 
 [address] [facility] 
[phone] Patient: Kenneth Vang MRN: [id number] [facility]:22/56/0078 Visit Information Date & Time Provider Department Dept. Phone Encounter #  
 6/25/2018  8:40 AM Nidia Barrera MD [facility], [address] [facility] [place] [phone] [id number] Follow-up Instructions Return in about 4 weeks (around 7/23/2018). Upcoming Health Maintenance Date Due Hepatitis C Screening 1955 DTaP/Tdap/Td series (1 - Tdap) 12/14/1977 PAP AKA CERVICAL CYTOLOGY 12/14/1977 BREAST CANCER SCRN MAMMOGRAM 12/14/2006 FOBT Q 1 YEAR AGE 50-75 12/14/2006 ZOSTER VACCINE AGE 60> 10/14/2016 Influenza Age 5 to Adult 8/1/2018 Allergies as of 6/25/2018  Review Complete On: 6/25/2018 By: Nidia Barrera MD  
  
 Severity Noted Reaction Type Reactions Sulfa (Sulfonamide Antibiotics) High 10/28/2015    Anaphylaxis Current Immunizations  Never Reviewed No immunizations on file. Not reviewed this visit You Were Diagnosed With   
  
 Codes Comments Cervical spinal stenosis    -  Primary ICD-10-CM: M48.02 
ICD-9-CM: 723.0   
 DDD (degenerative disc disease), cervical     ICD-10-CM: M50.30 ICD-9-CM: 722.4 Radiculopathy, cervical region     ICD-10-CM: M54.12 
ICD-9-CM: 723.4 Low back pain, unspecified back pain laterality, unspecified chronicity, with sciatica presence unspecified     ICD-10-CM: M54.5 ICD-9-CM: 724.2 Vitals BP Pulse Temp Height(growth percentile) Weight(growth percentile) SpO2  
 126/52 67 97.2 °F (36.2 °C) (Oral) 5' 2\" (1.575 m) 102 lb (46.3 kg) 98% BMI Smoking Status 18.66 kg/m2 Former Smoker BMI and BSA Data Body Mass Index Body Surface Area  
 18.66 kg/m 2 1.42 m 2 Preferred Pharmacy Pharmacy Name Phone [address] [place] [phone] Your Updated Medication List  
  
   
This list is accurate as of 6/25/18  9:06 AM.  Always use your most recent med list.  
  
  
  
  
 atorvastatin 20 mg tablet Commonly known as:  LIPITOR Take 20 mg by mouth daily. citalopram 40 mg tablet Commonly known as:  Margreta Francesca Take 40 mg by mouth daily. diclofenac 1.3 % Pt12 Commonly known as:  FLECTOR  
1 Patch by TransDERmal route every twelve (12) hours every twelve (12) hours. methylPREDNISolone 4 mg tablet Commonly known as:  Colan Necessary Per dose pack instructions  
  
 naproxen 500 mg tablet Commonly known as:  NAPROSYN Take 1 Tab by mouth two (2) times daily (with meals). tiaGABine 4 mg tablet Commonly known as:  GABITRIL Take 1 Tab by mouth two (2) times daily (with meals). * topiramate 25 mg tablet Commonly known as:  TOPAMAX 3 tabs PO QHS * topiramate 25 mg tablet Commonly known as:  TOPAMAX 3 tabs PO QHS  
  
 traZODone 100 mg tablet Commonly known as:  Illene Dus Take 100 mg by mouth nightly. * Notice: This list has 2 medication(s) that are the same as other medications prescribed for you. Read the directions carefully, and ask your doctor or other care provider to review them with you. Prescriptions Sent to Pharmacy Refills  
 tiaGABine (GABITRIL) 4 mg tablet 1 Sig: Take 1 Tab by mouth two (2) times daily (with meals). Class: Normal  
 Pharmacy: [facility] [address] #: [phone] Route: Oral  
  
Follow-up Instructions Return in about 4 weeks (around 7/23/2018). Introducing [facility] & HEALTH SERVICES! Dear Lizet Crum: Thank you for requesting a [employer] account. Our records indicate that you already have an active [employer] account. You can access your account anytime at [email or web address] [employer] Did you know that you can access your hospital and ER discharge instructions at any time in [employer]? You can also review all of your test results from your hospital stay or ER visit. Additional Information If you have questions, please visit the Frequently Asked Questions section of the [employer] website at [email or web address] Remember, [employer] is NOT to be used for urgent needs. For medical emergencies, dial 911. Now available from your iPhone and Android! Please provide this summary of care documentation to your next provider. Your primary care clinician is listed as Yannick Cardenas. If you have any questions after today's visit, please call [phone].

## 2018-07-23 ENCOUNTER — OFFICE VISIT (OUTPATIENT)
Dept: ORTHOPEDIC SURGERY | Age: 63
End: 2018-07-23

## 2018-07-23 VITALS
SYSTOLIC BLOOD PRESSURE: 119 MMHG | DIASTOLIC BLOOD PRESSURE: 73 MMHG | BODY MASS INDEX: 19.14 KG/M2 | HEART RATE: 72 BPM | RESPIRATION RATE: 16 BRPM | WEIGHT: 104 LBS | HEIGHT: 62 IN | OXYGEN SATURATION: 98 %

## 2018-07-23 DIAGNOSIS — M54.12 RADICULOPATHY, CERVICAL REGION: ICD-10-CM

## 2018-07-23 DIAGNOSIS — M50.30 DDD (DEGENERATIVE DISC DISEASE), CERVICAL: ICD-10-CM

## 2018-07-23 DIAGNOSIS — M48.02 CERVICAL SPINAL STENOSIS: Primary | ICD-10-CM

## 2018-07-23 RX ORDER — TOPIRAMATE 25 MG/1
TABLET ORAL
Qty: 270 TAB | Refills: 0 | Status: SHIPPED | OUTPATIENT
Start: 2018-07-23 | End: 2018-10-29 | Stop reason: SDUPTHER

## 2018-07-23 NOTE — PROGRESS NOTES
[facility]  [address], [place]   Phone: [phone]  Fax: [phone]        PROGRESS NOTE      HISTORY OF PRESENT ILLNESS:  The patient is a 65 y.o. female and was seen today for follow up of neck pain extending into the bilateral shoulders and into the RUE to the hand, involving all digits. Pt describes a stabbing pain of the right hand. Her LUE continues to do well. Pt reports infrequent LOB and impairments in manual dexterity with the RUE. I previously felt her impairments in manual dexterity of the RUE were more consistent with arthritis. Pt continues to have low back and right buttocks pain extending into the RLE in an S1 distribution to the knee along with a burning sensation in the right ankle. She reports she has seen Dr. Jeff Agosto for her right hand pain in the past who administered a cortisone injection with good relief. Pt has seen Dr. Candido Burroughs for her right hand who administered an injection which did not provide relief. Pt notes her pain is the worst when she is at work or with increased activity in general. Pt has a h/o extensive spinal fusion due to h/o scoliosis > 30 years ago. Pt failed NEURONTIN. She reports intolerance to [facility] due to facial/tongue swelling and lethargy. Pt discontinued Cymbalta as it was not beneficial for her depression. Noted, she tolerated Topamax 50 mg qam and 75 mg qhs without additional relief. Pt has been treated with antiinflammatories, muscle relaxers, and steroids without relief. She completed MDP with significant but transient relief. Patient performs her HEP daily. Patient denies change in bowel or bladder habits, but notes her uterus and bladder have dropped. Pt denies hx of glaucoma or DM. A RUE EMG dated 6/7/16 was within normal limits. Preliminary reading of the cervical spine plain films revealed, degenerative disc disease at C4-C5 and C5-C6. No acute pathology identified.  Preliminary reading of the thoracic spine plain films revealed, moderate scoliotic curvature of the thoracic spine convex to the right at the L1 level. No acute pathology identified. Cervical spine MRI dated 8/7/17 reviewed. Per report, straightening of the cervical curvature. Multilevel degenerative joint and disc disease. Posterior disc protrusion/bulges at C3-4 through C7-T1. Spinal stenosis mild to moderate at C4-5,and mild at C5-6 and C6-7. Multilevel foraminal narrowing secondary to uncovertebral and facet disease most significant on the right at C4-5 and bilateral at C5-6. At her last clinical appointment, clinically, I did not see signs of worsening of LOB or weakness during the physical examination. Patient was neurologically intact. Patient was not interested in surgical intervention or a referral to pain management at that time. Patient weaned off of Topamax 75 mg qhs. I tried her on Gabitril 4 mg BID. The patient returns today with neck pain extending into the RUE in a C7 distribution to the 3rd digit. pain location and distribution remain unchanged. She rates her pain 7-10/10, a decrease from her last visit (10/10). She did not tolerate Gabitril 4 mg qhs and reported no relief. She reports some stumbling and some worsening LOB.  reviewed. Body mass index is 19.02 kg/(m^2). PCP: Marisol Lambert MD      Past Medical History:   Diagnosis Date    Degenerative disc disease, cervical     Psychiatric disorder     Scoliosis     Spinal stenosis in cervical region         Social History     Social History    Marital status: SINGLE     Spouse name: N/A    Number of children: N/A    Years of education: N/A     Occupational History    Not on file.      Social History Main Topics    Smoking status: Former Smoker     Quit date: 2013    Smokeless tobacco: Never Used    Alcohol use No    Drug use: No    Sexual activity: Not on file     Other Topics Concern    Not on file     Social History Narrative Current Outpatient Prescriptions   Medication Sig Dispense Refill    topiramate (TOPAMAX) 25 mg tablet 3 po qhs 270 Tab 0    topiramate (TOPAMAX) 25 mg tablet 3 tabs PO  Tab 1    atorvastatin (LIPITOR) 20 mg tablet Take 20 mg by mouth daily.  citalopram (CELEXA) 40 mg tablet Take 40 mg by mouth daily.  traZODone (DESYREL) 100 mg tablet Take 100 mg by mouth nightly.  topiramate (TOPAMAX) 25 mg tablet 3 tabs PO QHS 90 Tab 5    methylPREDNISolone (MEDROL DOSEPACK) 4 mg tablet Per dose pack instructions 1 Dose Pack 0    naproxen (NAPROSYN) 500 mg tablet Take 1 Tab by mouth two (2) times daily (with meals). 30 Tab 0    diclofenac (FLECTOR) 1.3 % pt12 1 Patch by TransDERmal route every twelve (12) hours every twelve (12) hours. 30 Patch 2       Allergies   Allergen Reactions    Sulfa (Sulfonamide Antibiotics) Anaphylaxis          PHYSICAL EXAMINATION    Visit Vitals    /73    Pulse 72    Resp 16    Ht 5' 2\" (1.575 m)    Wt 47.2 kg (104 lb)    SpO2 98%    BMI 19.02 kg/m2       CONSTITUTIONAL: NAD, A&O x 3  SENSATION: Intact to light touch throughout  NEURO: Edin's is negative bilaterally. RANGE OF MOTION: The patient has full passive range of motion in all four extremities. Tandem gait: negative     Shoulder AB/Flex Elbow Flex Wrist Ext Elbow Ext Wrist Flex Hand Intrin Tone   Right +4/5 +4/5 +4/5 +4/5 +4/5 +4/5 +4/5   Left +4/5 +4/5 +4/5 +4/5 +4/5 +4/5 +4/5                 ASSESSMENT   Diagnoses and all orders for this visit:    1. Cervical spinal stenosis  -     topiramate (TOPAMAX) 25 mg tablet; 3 po qhs    2. DDD (degenerative disc disease), cervical  -     topiramate (TOPAMAX) 25 mg tablet; 3 po qhs    3. Radiculopathy, cervical region  -     topiramate (TOPAMAX) 25 mg tablet; 3 po qhs          IMPRESSION AND PLAN:  Patient should d/c Gabitril 4 mg qhs. I will restart her on Topamax 25 mg qhs. Patient advised to call the office if intolerant to medication.  Patient reports a worsening LOB, but upon examination there was no LOB noted. Patient advised to monitor her LOB and coordination issues and notify the office is these worsen. Patient is neurologically intact. I offered again to refer the patient to pain management but she declined. 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 I will see the patient back in 3 month's time or earlier if needed. Written by Jase Johnson, as dictated by Delfina Bunch MD  I examined the patient, reviewed and agree with the note.

## 2018-07-23 NOTE — MR AVS SNAPSHOT
[address] [facility] 
 
 
 [address] [facility] 
[phone] Patient: Kathie Meza MRN: [id number] JUDITH:[id number] Visit Information Date & Time Provider Department Dept. Phone Encounter #  
 7/23/2018 10:25 AM Marcelo Jensen MD [facility] [place] [phone] [id number] Follow-up Instructions Return in about 3 months (around 10/23/2018). Upcoming Health Maintenance Date Due Hepatitis C Screening 1955 DTaP/Tdap/Td series (1 - Tdap) 12/14/1977 PAP AKA CERVICAL CYTOLOGY 12/14/1977 BREAST CANCER SCRN MAMMOGRAM 12/14/2006 FOBT Q 1 YEAR AGE 50-75 12/14/2006 ZOSTER VACCINE AGE 60> 10/14/2016 Influenza Age 5 to Adult 8/1/2018 Allergies as of 7/23/2018  Review Complete On: 7/23/2018 By: Marcelo Jensen MD  
  
 Severity Noted Reaction Type Reactions Sulfa (Sulfonamide Antibiotics) High 10/28/2015    Anaphylaxis Current Immunizations  Never Reviewed No immunizations on file. Not reviewed this visit You Were Diagnosed With   
  
 Codes Comments Cervical spinal stenosis    -  Primary ICD-10-CM: M48.02 
ICD-9-CM: 723.0   
 DDD (degenerative disc disease), cervical     ICD-10-CM: M50.30 ICD-9-CM: 722.4 Radiculopathy, cervical region     ICD-10-CM: M54.12 
ICD-9-CM: 723.4 Vitals BP Pulse Resp Height(growth percentile) Weight(growth percentile) SpO2  
 119/73 72 16 5' 2\" (1.575 m) 104 lb (47.2 kg) 98% BMI Smoking Status 19.02 kg/m2 Former Smoker BMI and BSA Data Body Mass Index Body Surface Area 19.02 kg/m 2 1.44 m 2 Preferred Pharmacy Pharmacy Name Phone [facility], [address] [place] [phone] Your Updated Medication List  
  
   
This list is accurate as of 7/23/18 10:54 AM.  Always use your most recent med list.  
  
  
  
  
 atorvastatin 20 mg tablet Commonly known as:  LIPITOR Take 20 mg by mouth daily. citalopram 40 mg tablet Commonly known as:  Ledon Mohit Take 40 mg by mouth daily. diclofenac 1.3 % Pt12 Commonly known as:  FLECTOR  
1 Patch by TransDERmal route every twelve (12) hours every twelve (12) hours. methylPREDNISolone 4 mg tablet Commonly known as:  Kendall Hasroscoe Per dose pack instructions  
  
 naproxen 500 mg tablet Commonly known as:  NAPROSYN Take 1 Tab by mouth two (2) times daily (with meals). * topiramate 25 mg tablet Commonly known as:  TOPAMAX 3 tabs PO QHS * topiramate 25 mg tablet Commonly known as:  TOPAMAX 3 tabs PO QHS * topiramate 25 mg tablet Commonly known as:  TOPAMAX 3 po qhs  
  
 traZODone 100 mg tablet Commonly known as:  David Ocean Take 100 mg by mouth nightly. * Notice: This list has 3 medication(s) that are the same as other medications prescribed for you. Read the directions carefully, and ask your doctor or other care provider to review them with you. Prescriptions Sent to Pharmacy Refills  
 topiramate (TOPAMAX) 25 mg tablet 0 Sig: 3 po qhs  
 Class: Normal  
 Pharmacy: [facility] [address] [facility]IXON GOMEZ [facility] [address] [facility] #: [phone] Follow-up Instructions Return in about 3 months (around 10/23/2018). Introducing [address]! Dear Wanda Melgoza: Thank you for requesting a InboundWriter account. Our records indicate that you already have an active InboundWriter account. You can access your account anytime at [email or web address] Did you know that you can access your hospital and ER discharge instructions at any time in InboundWriter? You can also review all of your test results from your hospital stay or ER visit. Additional Information If you have questions, please visit the Frequently Asked Questions section of the InboundWriter website at [email or web address] Remember, MyChart is NOT to be used for urgent needs. For medical emergencies, dial 911. Now available from your iPhone and Android! Please provide this summary of care documentation to your next provider. Your primary care clinician is listed as Sandro Holt. If you have any questions after today's visit, please call [phone].

## 2018-10-29 ENCOUNTER — OFFICE VISIT (OUTPATIENT)
Dept: ORTHOPEDIC SURGERY | Age: 63
End: 2018-10-29

## 2018-10-29 VITALS
OXYGEN SATURATION: 97 % | HEIGHT: 62 IN | SYSTOLIC BLOOD PRESSURE: 136 MMHG | RESPIRATION RATE: 14 BRPM | DIASTOLIC BLOOD PRESSURE: 61 MMHG | HEART RATE: 81 BPM | BODY MASS INDEX: 18.88 KG/M2 | WEIGHT: 102.6 LBS

## 2018-10-29 DIAGNOSIS — M50.30 DDD (DEGENERATIVE DISC DISEASE), CERVICAL: ICD-10-CM

## 2018-10-29 DIAGNOSIS — M54.12 RADICULOPATHY, CERVICAL REGION: ICD-10-CM

## 2018-10-29 DIAGNOSIS — M48.02 CERVICAL SPINAL STENOSIS: ICD-10-CM

## 2018-10-29 DIAGNOSIS — M48.02 CERVICAL SPINAL STENOSIS: Primary | ICD-10-CM

## 2018-10-29 RX ORDER — TOPIRAMATE 25 MG/1
TABLET ORAL
Qty: 90 TAB | Refills: 5 | Status: SHIPPED | OUTPATIENT
Start: 2018-10-29 | End: 2020-09-21 | Stop reason: SDUPTHER

## 2018-10-29 NOTE — PROGRESS NOTES
[facility] 
[address], [place] Phone: [phone] Fax: [phone] PROGRESS NOTE HISTORY OF PRESENT ILLNESS: 
The patient is a 65 y.o. female and was seen today for follow up of neck pain extending into the RUE in a C7 distribution to the 3rd digit. She was initially seen with neck pain extending into the bilateral shoulders and into the RUE to the hand, involving all digits. Pt describes a stabbing pain of the right hand. Her LUE continues to do well. Pt reports infrequent LOB and impairments in manual dexterity with the RUE. I previously felt her impairments in manual dexterity of the RUE were more consistent with arthritis. Pt continues to have low back and right buttocks pain extending into the RLE in an S1 distribution to the knee along with a burning sensation in the right ankle. She reports she has seen Dr. Malik Cuba for her right hand pain in the past who administered a cortisone injection with good relief. Pt has seen Dr. Darien Gomez for her right hand who administered an injection which did not provide relief. Pt notes her pain is the worst when she is at work or with increased activity in general. Pt has a h/o extensive spinal fusion due to h/o scoliosis > 30 years ago. Pt failed NEURONTIN. She reports intolerance to [facility] due to facial/tongue swelling and lethargy. She did not tolerate Gabitril 4 mg qhs and reported no relief. Pt discontinued Cymbalta as it was not beneficial for her depression. Noted, she tolerated Topamax 50 mg qam and 75 mg qhs without additional relief. Pt has been treated with antiinflammatories, muscle relaxers, and steroids without relief. She completed MDP with significant but transient relief. Patient performs her HEP daily. Patient denies change in bowel or bladder habits, but notes her uterus and bladder have dropped. Pt denies hx of glaucoma or DM.  A RUE EMG dated 6/7/16 was within normal limits. Preliminary reading of the cervical spine plain films revealed, degenerative disc disease at C4-C5 and C5-C6. No acute pathology identified. Preliminary reading of the thoracic spine plain films revealed, moderate scoliotic curvature of the thoracic spine convex to the right at the L1 level. No acute pathology identified. Cervical spine MRI dated 8/7/17 reviewed. Per report, straightening of the cervical curvature. Multilevel degenerative joint and disc disease. Posterior disc protrusion/bulges at C3-4 through C7-T1. Spinal stenosis mild to moderate at C4-5,and mild at C5-6 and C6-7. Multilevel foraminal narrowing secondary to uncovertebral and facet disease most significant on the right at C4-5 and bilateral at C5-6. At her last clinical appointment, patient d/c Gabitril 4 mg qhs. I restarted her on Topamax 25 mg qhs. Patient reported a worsening LOB, but upon examination there was no LOB noted. Patient advised to monitor her LOB and coordination issues and notify the office is these worsen. I offered again to refer the patient to pain management but she declined. 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 The patient returns today with pain location and distribution remain unchanged. She rates her pain 9/10, consistent with her last visit (7-10/10). She is tolerating Topamax 75 mg qhs with minimal relief. She reports that her pain has increased. Pt denies dropping things or loss of balance.  reviewed. Body mass index is 18.77 kg/m². PCP: Jennifer Browning MD 
 
 
Past Medical History:  
Diagnosis Date  Degenerative disc disease, cervical   
 Psychiatric disorder  Scoliosis  Spinal stenosis in cervical region Social History Socioeconomic History  Marital status: SINGLE Spouse name: Not on file  Number of children: Not on file  Years of education: Not on file  Highest education level: Not on file Social Needs  Financial resource strain: Not on file  Food insecurity - worry: Not on file  Food insecurity - inability: Not on file  Transportation needs - medical: Not on file  Transportation needs - non-medical: Not on file Occupational History  Not on file Tobacco Use  Smoking status: Former Smoker Last attempt to quit:  Years since quittin.8  Smokeless tobacco: Never Used Substance and Sexual Activity  Alcohol use: No  
  Alcohol/week: 0.0 oz  Drug use: No  
 Sexual activity: Not on file Other Topics Concern  Not on file Social History Narrative  Not on file Current Outpatient Medications Medication Sig Dispense Refill  topiramate (TOPAMAX) 25 mg tablet 3 po qhs 270 Tab 0  
 topiramate (TOPAMAX) 25 mg tablet 3 tabs PO QHS 90 Tab 5  
 methylPREDNISolone (MEDROL DOSEPACK) 4 mg tablet Per dose pack instructions 1 Dose Pack 0  
 naproxen (NAPROSYN) 500 mg tablet Take 1 Tab by mouth two (2) times daily (with meals). 30 Tab 0  
 topiramate (TOPAMAX) 25 mg tablet 3 tabs PO  Tab 1  
 diclofenac (FLECTOR) 1.3 % pt12 1 Patch by TransDERmal route every twelve (12) hours every twelve (12) hours. 30 Patch 2  
 atorvastatin (LIPITOR) 20 mg tablet Take 20 mg by mouth daily.  citalopram (CELEXA) 40 mg tablet Take 40 mg by mouth daily.  traZODone (DESYREL) 100 mg tablet Take 100 mg by mouth nightly. Allergies Allergen Reactions  Sulfa (Sulfonamide Antibiotics) Anaphylaxis PHYSICAL EXAMINATION Visit Vitals /61 Pulse 81 Resp 14 Ht 5' 2\" (1.575 m) Wt 102 lb 9.6 oz (46.5 kg) SpO2 97% BMI 18.77 kg/m² CONSTITUTIONAL: NAD, A&O x 3 SENSATION: Decreased sensation to light touch digits in the RUE. Sensation to light touch otherwise intact. NEURO: Edin's is negative bilaterally. RANGE OF MOTION: The patient has full passive range of motion in all four extremities. Shoulder AB/Flex Elbow Flex Wrist Ext Elbow Ext Wrist Flex Hand Intrin Tone Right +4/5 +4/5 +4/5 +4/5 +4/5 +4/5 +4/5 Left +4/5 +4/5 +4/5 +4/5 +4/5 +4/5 +4/5 ASSESSMENT Diagnoses and all orders for this visit: 1. Cervical spinal stenosis 2. DDD (degenerative disc disease), cervical 
 
3. Radiculopathy, cervical region IMPRESSION AND PLAN: 
I will refer her to pain management. I provided her with refills of her Topamax 75 mg qhs. Patient is neurologically intact. Patient is not interested in surgical intervention at this time. I will see the patient back in 6 month's time or earlier if needed. Written by Ramez Carter, as dictated by Rita Mauro MD 
I examined the patient, reviewed and agree with the note.

## 2019-05-20 ENCOUNTER — OFFICE VISIT (OUTPATIENT)
Dept: ORTHOPEDIC SURGERY | Age: 64
End: 2019-05-20

## 2019-05-20 VITALS
HEIGHT: 62 IN | RESPIRATION RATE: 14 BRPM | BODY MASS INDEX: 19.84 KG/M2 | HEART RATE: 78 BPM | WEIGHT: 107.8 LBS | SYSTOLIC BLOOD PRESSURE: 130 MMHG | DIASTOLIC BLOOD PRESSURE: 71 MMHG | TEMPERATURE: 98.1 F | OXYGEN SATURATION: 99 %

## 2019-05-20 DIAGNOSIS — M48.02 CERVICAL SPINAL STENOSIS: Primary | ICD-10-CM

## 2019-05-20 DIAGNOSIS — M54.12 RADICULOPATHY, CERVICAL REGION: ICD-10-CM

## 2019-05-20 DIAGNOSIS — M50.30 DDD (DEGENERATIVE DISC DISEASE), CERVICAL: ICD-10-CM

## 2019-05-20 RX ORDER — TOPIRAMATE 25 MG/1
TABLET ORAL
Qty: 270 TAB | Refills: 1 | Status: SHIPPED | OUTPATIENT
Start: 2019-05-20 | End: 2020-09-21 | Stop reason: SDUPTHER

## 2019-05-20 NOTE — LETTER
5/20/19 Patient: Amie Gill YOB: 1955 Date of Visit: 5/20/2019 Ирина Santos MD 
[address] [facility] [address] [place], [place] [id number] VIA Facsimile: [phone] Dear Ирина Santos MD, Thank you for referring Ms. Jey Walden to [address] [place] [address] for evaluation. My notes for this consultation are attached. If you have questions, please do not hesitate to call me. I look forward to following your patient along with you. Sincerely, Sawyer Plaza MD

## 2019-05-20 NOTE — PROGRESS NOTES
[facility]  [address], [place]   Phone: [phone]  Fax: [phone]        PROGRESS NOTE      HISTORY OF PRESENT ILLNESS:  The patient is a 59 y.o. female and was seen today for follow up of neck pain extending into the RUE in a C7 distribution to the 3rd digit. She was initially seen with neck pain extending into the bilateral shoulders and into the RUE to the hand, involving all digits. Pt describes a stabbing pain of the right hand. Her LUE continues to do well. Pt reports infrequent LOB and impairments in manual dexterity with the RUE. I previously felt her impairments in manual dexterity of the RUE were more consistent with arthritis. Pt continues to have low back and right buttocks pain extending into the RLE in an S1 distribution to the knee along with a burning sensation in the right ankle. She reports she has seen Dr. Kevin Xiao for her right hand pain in the past who administered a cortisone injection with good relief. Pt has seen Dr. Italo Berrios for her right hand who administered an injection which did not provide relief. Pt notes her pain is the worst when she is at work or with increased activity in general. Pt has a h/o extensive spinal fusion due to h/o scoliosis > 30 years ago. Pt failed NEURONTIN. She [facility] due to facial/tongue swelling and lethargy. She did not tolerate Gabitril 4 mg qhs and reported no relief. Pt discontinued Cymbalta as it was not beneficial for her depression. Noted, she tolerated Topamax 50 mg qam and 75 mg qhs without additional relief. Pt has been treated with antiinflammatories, muscle relaxers, and steroids without relief. She completed MDP with significant but transient relief. Patient performs her HEP daily. Patient denies change in bowel or bladder habits, but notes her uterus and bladder have dropped. Pt denies hx of glaucoma or DM. A RUE EMG dated 6/7/16 was within normal limits. Preliminary reading of the cervical spine plain films revealed, degenerative disc disease at C4-C5 and C5-C6. No acute pathology identified. Preliminary reading of the thoracic spine plain films revealed, moderate scoliotic curvature of the thoracic spine convex to the right at the L1 level. No acute pathology identified. Cervical spine MRI dated 8/7/17 reviewed. Per report, straightening of the cervical curvature. Multilevel degenerative joint and disc disease. Posterior disc protrusion/bulges at C3-4 through C7-T1. Spinal stenosis mild to moderate at C4-5,and mild at C5-6 and C6-7. Multilevel foraminal narrowing secondary to uncovertebral and facet disease most significant on the right at C4-5 and bilateral at C5-6. At her last clinical appointment, I referred her to pain management. I provided her with refills of her Topamax 75 mg qhs. The patient returns today with pain location and distribution remain unchanged. Pt reports her RUE feels like \"dead weight. \" She continues to rate her pain 9/10. Pt reports she never had an appointment with pain management as she never heard back from our office. She reports dropping things with her RUE and occasional LOB, progressive in nature. Denies falls.  reviewed. Body mass index is 19.72 kg/m².       PCP: Santosh Caal MD      Past Medical History:   Diagnosis Date    Degenerative disc disease, cervical     Psychiatric disorder     Scoliosis     Spinal stenosis in cervical region         Social History     Socioeconomic History    Marital status: SINGLE     Spouse name: Not on file    Number of children: Not on file    Years of education: Not on file    Highest education level: Not on file   Occupational History    Not on file   Social Needs    Financial resource strain: Not on file    Food insecurity:     Worry: Not on file     Inability: Not on file    Transportation needs:     Medical: Not on file     Non-medical: Not on file   Tobacco Use  Smoking status: Former Smoker     Last attempt to quit: 2013     Years since quittin.3    Smokeless tobacco: Never Used   Substance and Sexual Activity    Alcohol use: No     Alcohol/week: 0.0 oz    Drug use: No    Sexual activity: Not on file   Lifestyle    Physical activity:     Days per week: Not on file     Minutes per session: Not on file    Stress: Not on file   Relationships    Social connections:     Talks on phone: Not on file     Gets together: Not on file     Attends Mandaeism service: Not on file     Active member of club or organization: Not on file     Attends meetings of clubs or organizations: Not on file     Relationship status: Not on file    Intimate partner violence:     Fear of current or ex partner: Not on file     Emotionally abused: Not on file     Physically abused: Not on file     Forced sexual activity: Not on file   Other Topics Concern    Not on file   Social History Narrative    Not on file       Current Outpatient Medications   Medication Sig Dispense Refill    topiramate (TOPAMAX) 25 mg tablet 3 po qhs 90 Tab 5    topiramate (TOPAMAX) 25 mg tablet 3 tabs PO QHS 90 Tab 5    topiramate (TOPAMAX) 25 mg tablet 3 tabs PO  Tab 1    atorvastatin (LIPITOR) 20 mg tablet Take 20 mg by mouth daily.  citalopram (CELEXA) 40 mg tablet Take 40 mg by mouth daily.  traZODone (DESYREL) 100 mg tablet Take 100 mg by mouth nightly.  methylPREDNISolone (MEDROL DOSEPACK) 4 mg tablet Per dose pack instructions 1 Dose Pack 0    naproxen (NAPROSYN) 500 mg tablet Take 1 Tab by mouth two (2) times daily (with meals). 30 Tab 0    diclofenac (FLECTOR) 1.3 % pt12 1 Patch by TransDERmal route every twelve (12) hours every twelve (12) hours.  30 Patch 2       Allergies   Allergen Reactions    Sulfa (Sulfonamide Antibiotics) Anaphylaxis          PHYSICAL EXAMINATION    Visit Vitals  /71   Pulse 78   Temp 98.1 °F (36.7 °C) (Oral)   Resp 14   Ht 5' 2\" (1.575 m)   Wt 107 lb 12.8 oz (48.9 kg)   SpO2 99%   BMI 19.72 kg/m²       CONSTITUTIONAL: NAD, A&O x 3  SENSATION: Intact to light touch throughout  NEURO: Edin's is negative bilaterally. RANGE OF MOTION: The patient has full passive range of motion in all four extremities. Shoulder AB/Flex Elbow Flex Wrist Ext Elbow Ext Wrist Flex Hand Intrin Tone   Right +4/5 +4/5 +4/5 +4/5 +4/5 +4/5 +4/5   Left +4/5 +4/5 +4/5 +4/5 +4/5 +4/5 +4/5                 ASSESSMENT   Diagnoses and all orders for this visit:    1. Cervical spinal stenosis    2. DDD (degenerative disc disease), cervical    3. Radiculopathy, cervical region          IMPRESSION AND PLAN:  Patient states her RUE feels like \"dead weight. \" She reports dropping things with her RUE and occasional LOB, though I did not appreciate this upon physical examination. Patient is not interested in surgical intervention at this time. I will once again refer her to pain management. I provided her with refills of Topamax 75 mg qhs. Patient is neurologically intact. I will see the patient back in 6 month's time or earlier if needed. Written by Mindi Be, as dictated by Marline Freedman MD  I examined the patient, reviewed and agree with the note.

## 2020-09-21 ENCOUNTER — OFFICE VISIT (OUTPATIENT)
Dept: FAMILY MEDICINE CLINIC | Age: 65
End: 2020-09-21
Payer: COMMERCIAL

## 2020-09-21 VITALS
BODY MASS INDEX: 19.77 KG/M2 | RESPIRATION RATE: 16 BRPM | WEIGHT: 107.4 LBS | HEART RATE: 67 BPM | HEIGHT: 62 IN | OXYGEN SATURATION: 99 % | SYSTOLIC BLOOD PRESSURE: 123 MMHG | DIASTOLIC BLOOD PRESSURE: 70 MMHG | TEMPERATURE: 97.3 F

## 2020-09-21 DIAGNOSIS — E78.2 MIXED HYPERLIPIDEMIA: ICD-10-CM

## 2020-09-21 DIAGNOSIS — F32.A ANXIETY AND DEPRESSION: ICD-10-CM

## 2020-09-21 DIAGNOSIS — F41.9 ANXIETY AND DEPRESSION: ICD-10-CM

## 2020-09-21 DIAGNOSIS — Z12.31 SCREENING MAMMOGRAM, ENCOUNTER FOR: Primary | ICD-10-CM

## 2020-09-21 PROBLEM — F33.1 DEPRESSION, MAJOR, RECURRENT, MODERATE (HCC): Status: ACTIVE | Noted: 2020-09-21

## 2020-09-21 PROCEDURE — 99213 OFFICE O/P EST LOW 20 MIN: CPT | Performed by: NURSE PRACTITIONER

## 2020-09-21 RX ORDER — TRAZODONE HYDROCHLORIDE 100 MG/1
100 TABLET ORAL
Qty: 90 TAB | Refills: 1 | Status: SHIPPED | OUTPATIENT
Start: 2020-09-21 | End: 2021-06-14 | Stop reason: SDUPTHER

## 2020-09-21 RX ORDER — TOPIRAMATE 25 MG/1
TABLET ORAL
Qty: 270 TAB | Refills: 1 | Status: SHIPPED | OUTPATIENT
Start: 2020-09-21 | End: 2021-03-11

## 2020-09-21 RX ORDER — ATORVASTATIN CALCIUM 20 MG/1
20 TABLET, FILM COATED ORAL DAILY
Qty: 90 TAB | Refills: 1 | Status: SHIPPED | OUTPATIENT
Start: 2020-09-21 | End: 2021-10-11 | Stop reason: SDUPTHER

## 2020-09-21 RX ORDER — CITALOPRAM 40 MG/1
40 TABLET, FILM COATED ORAL DAILY
Qty: 90 TAB | Refills: 1 | Status: SHIPPED | OUTPATIENT
Start: 2020-09-21 | End: 2021-06-14 | Stop reason: SDUPTHER

## 2020-09-21 NOTE — PROGRESS NOTES
Haig Kocher presents today for   Chief Complaint   Patient presents with    Physical       Is someone accompanying this pt? No     Is the patient using any DME equipment during OV? No     Depression Screening:  3 most recent PHQ Screens 9/21/2020   PHQ Not Done -   Little interest or pleasure in doing things Nearly every day   Feeling down, depressed, irritable, or hopeless Nearly every day   Total Score PHQ 2 6   Trouble falling or staying asleep, or sleeping too much Nearly every day   Feeling tired or having little energy Nearly every day   Poor appetite, weight loss, or overeating Not at all   Feeling bad about yourself - or that you are a failure or have let yourself or your family down Nearly every day   Trouble concentrating on things such as school, work, reading, or watching TV Not at all   Moving or speaking so slowly that other people could have noticed; or the opposite being so fidgety that others notice Nearly every day   Thoughts of being better off dead, or hurting yourself in some way Not at all   PHQ 9 Score 18   How difficult have these problems made it for you to do your work, take care of your home and get along with others Somewhat difficult       Learning Assessment:  Learning Assessment 9/21/2020   PRIMARY LEARNER Patient   HIGHEST LEVEL OF EDUCATION - PRIMARY LEARNER  GRADUATED HIGH SCHOOL OR GED   BARRIERS PRIMARY LEARNER NONE   CO-LEARNER CAREGIVER No   PRIMARY LANGUAGE ENGLISH   LEARNER PREFERENCE PRIMARY DEMONSTRATION   ANSWERED BY Isaías Alvarez   RELATIONSHIP SELF       Abuse Screening:  Abuse Screening Questionnaire 9/21/2020   Do you ever feel afraid of your partner? N   Are you in a relationship with someone who physically or mentally threatens you? N   Is it safe for you to go home? Y       Fall Risk  Fall Risk Assessment, last 12 mths 9/21/2020   Able to walk? Yes   Fall in past 12 months?  No       ADL  ADL Assessment 9/21/2020   Feeding yourself No Help Needed   Getting from bed to chair No Help Needed   Getting dressed No Help Needed   Bathing or showering No Help Needed   Walk across the room (includes cane/walker) No Help Needed   Using the telphone No Help Needed   Taking your medications No Help Needed   Preparing meals No Help Needed   Managing money (expenses/bills) No Help Needed   Moderately strenuous housework (laundry) No Help Needed   Shopping for personal items (toiletries/medicines) No Help Needed   Shopping for groceries No Help Needed   Driving No Help Needed   Climbing a flight of stairs No Help Needed   Getting to places beyond walking distances No Help Needed       Health Maintenance reviewed and discussed and ordered per Provider. Health Maintenance Due   Topic Date Due    Hepatitis C Screening  1955    Lipid Screen  12/14/1966    DTaP/Tdap/Td series (1 - Tdap) 12/14/1977    PAP AKA CERVICAL CYTOLOGY  12/14/1977    Shingrix Vaccine Age 50> (1 of 2) 12/14/2006    Breast Cancer Screen Mammogram  12/14/2006    FOBT Q1Y Age 50-75  12/14/2006    Flu Vaccine (1) 09/01/2020   . Coordination of Care:  1. Have you been to the ER, urgent care clinic since your last visit? Hospitalized since your last visit? No / No     2. Have you seen or consulted any other health care providers outside of the [address] [place] [address] since your last visit? Include any pap smears or colon screening.  No       Colonoscopy: maybe 5 years ago per patient  Pap- 2018   Mammogram- 2018

## 2020-09-21 NOTE — PROGRESS NOTES
Zeb Castrejon is a 62 y.o. female, on 9/21/2020 for Chronic Follow-up in office. Assessment & Plan:   Diagnoses and all orders for this visit:    1. Screening mammogram, encounter for  -     [facility] MAMMO BI SCREENING INCL CAD; Future    2. Mixed hyperlipidemia  -     CBC W/O DIFF; Future  -     LIPID PANEL; Future  -     METABOLIC PANEL, COMPREHENSIVE; Future    3. Anxiety and depression  -Controlled, continue with medications, for any worsening contact our office. For any homicidal or suicidal thoughts report to the ER at once. -     TSH 3RD GENERATION; Future    Other orders  -     topiramate (TOPAMAX) 25 mg tablet; 3 tabs PO QHS  -     citalopram (CELEXA) 40 mg tablet; Take 1 Tab by mouth daily. -     atorvastatin (LIPITOR) 20 mg tablet; Take 1 Tab by mouth daily. -     traZODone (DESYREL) 100 mg tablet; Take 1 Tab by mouth nightly. 12  Subjective:   Patient presents today for follow-up of anxiety/depression and  Hyperlipidemia. Reports she is doing well. Medications for depression/anxiety are effective. No worsening of either. Denies any homicidal or suicidal thoughts. She continues with atorvastatin for hyperlipidemia and is following a healthy diet. Denies any chest pains or shortness of breath. No lightheadedness or dizziness. No abdominal pains. No falls. Prior to Admission medications    Medication Sig Start Date End Date Taking? Authorizing Provider   topiramate (TOPAMAX) 25 mg tablet 3 tabs PO QHS 5/20/19  Yes Kiara Good MD   atorvastatin (LIPITOR) 20 mg tablet Take 20 mg by mouth daily. 8/25/15  Yes Provider, Historical   citalopram (CELEXA) 40 mg tablet Take 40 mg by mouth daily. 8/25/15  Yes Provider, Historical   traZODone (DESYREL) 100 mg tablet Take 100 mg by mouth nightly.  8/25/15  Yes Provider, Historical   topiramate (TOPAMAX) 25 mg tablet 3 po qhs 10/29/18   Doug Gama MD   topiramate (TOPAMAX) 25 mg tablet 3 tabs PO QHS 5/21/18   Doug Gaam MD   methylPREDNISolone (MEDROL DOSEPACK) 4 mg tablet Per dose pack instructions 5/21/18   Doug Gama MD   naproxen (NAPROSYN) 500 mg tablet Take 1 Tab by mouth two (2) times daily (with meals). 5/21/18   Maricarmen Coe MD   topiramate (TOPAMAX) 25 mg tablet 3 tabs PO QHS 11/20/17   Doug Gama MD   diclofenac (FLECTOR) 1.3 % pt12 1 Patch by TransDERmal route every twelve (12) hours every twelve (12) hours. 7/26/17   Shaw Hendrix MD         Review of Systems   Constitutional: Negative for chills, fever and weight loss. Eyes: Negative for blurred vision. Respiratory: Negative for cough, shortness of breath and wheezing. Cardiovascular: Negative for chest pain, palpitations and leg swelling. Gastrointestinal: Negative for abdominal pain and heartburn. Genitourinary: Negative for dysuria. Skin: Negative for rash. Neurological: Negative for dizziness, weakness and headaches. Psychiatric/Behavioral: Negative for depression. The patient is not nervous/anxious and does not have insomnia. Physical Exam  Constitutional:       Appearance: Normal appearance. She is normal weight. HENT:      Head: Normocephalic and atraumatic. Right Ear: Tympanic membrane normal.      Left Ear: Tympanic membrane normal.      Nose: Nose normal.      Mouth/Throat:      Mouth: Mucous membranes are moist.   Eyes:      Pupils: Pupils are equal, round, and reactive to light. Neck:      Musculoskeletal: Normal range of motion. Cardiovascular:      Rate and Rhythm: Normal rate and regular rhythm. Pulses: Normal pulses. Heart sounds: Normal heart sounds. Pulmonary:      Effort: Pulmonary effort is normal.      Breath sounds: Normal breath sounds. Abdominal:      General: Abdomen is flat. Bowel sounds are normal.      Palpations: Abdomen is soft. Musculoskeletal:         General: Deformity (Kyphosis) present. Skin:     General: Skin is warm and dry.       Capillary Refill: Capillary refill takes less than 2 seconds. Neurological:      General: No focal deficit present. Mental Status: She is alert and oriented to person, place, and time. Psychiatric:         Mood and Affect: Mood normal.         Behavior: Behavior normal.         Thought Content:  Thought content normal.         Judgment: Judgment normal.           Randall Mcclellan NP

## 2020-09-29 ENCOUNTER — HOSPITAL ENCOUNTER (OUTPATIENT)
Dept: LAB | Age: 65
Discharge: HOME | End: 2020-09-29

## 2020-10-01 NOTE — PROGRESS NOTES
Attempt to contact patient on her cell # to inform her of normal lab results per provider. Unable to leave messg due to mailbox being full. Will call patient back at a later time.

## 2020-10-06 NOTE — PROGRESS NOTES
Sent patient a my chart message as follows:    Good afternoon,     I hope this message finds you well, your request has been forwarded to [facility] your recent lab results are within normal limit. Recommendation:  Continue to work on diet and exercise. Keep all future appointments. These labs will be monitored. Keep up the good work! As always your good health is important to us and it is our goal to be your partner in life-long wellness.      Thank you,    Sarah Carolina/, AG

## 2020-10-26 ENCOUNTER — OFFICE VISIT (OUTPATIENT)
Dept: FAMILY MEDICINE CLINIC | Age: 65
End: 2020-10-26
Payer: COMMERCIAL

## 2020-10-26 VITALS
HEART RATE: 73 BPM | HEIGHT: 62 IN | OXYGEN SATURATION: 97 % | DIASTOLIC BLOOD PRESSURE: 74 MMHG | TEMPERATURE: 97.8 F | WEIGHT: 106.4 LBS | RESPIRATION RATE: 16 BRPM | SYSTOLIC BLOOD PRESSURE: 125 MMHG | BODY MASS INDEX: 19.58 KG/M2

## 2020-10-26 DIAGNOSIS — Z12.11 SCREEN FOR COLON CANCER: ICD-10-CM

## 2020-10-26 DIAGNOSIS — F41.9 ANXIETY: ICD-10-CM

## 2020-10-26 DIAGNOSIS — Z00.00 MEDICARE ANNUAL WELLNESS VISIT, SUBSEQUENT: Primary | ICD-10-CM

## 2020-10-26 DIAGNOSIS — Z13.39 SCREENING FOR ALCOHOLISM: ICD-10-CM

## 2020-10-26 DIAGNOSIS — Z12.31 ENCOUNTER FOR SCREENING MAMMOGRAM FOR MALIGNANT NEOPLASM OF BREAST: ICD-10-CM

## 2020-10-26 DIAGNOSIS — Z13.31 SCREENING FOR DEPRESSION: ICD-10-CM

## 2020-10-26 PROCEDURE — G0442 ANNUAL ALCOHOL SCREEN 15 MIN: HCPCS | Performed by: NURSE PRACTITIONER

## 2020-10-26 PROCEDURE — 90471 IMMUNIZATION ADMIN: CPT

## 2020-10-26 PROCEDURE — 99213 OFFICE O/P EST LOW 20 MIN: CPT | Performed by: NURSE PRACTITIONER

## 2020-10-26 PROCEDURE — G0444 DEPRESSION SCREEN ANNUAL: HCPCS | Performed by: NURSE PRACTITIONER

## 2020-10-26 PROCEDURE — 90682 RIV4 VACC RECOMBINANT DNA IM: CPT

## 2020-10-26 PROCEDURE — G0439 PPPS, SUBSEQ VISIT: HCPCS | Performed by: NURSE PRACTITIONER

## 2020-10-26 RX ORDER — HYDROXYZINE PAMOATE 25 MG/1
25 CAPSULE ORAL
Qty: 30 CAP | Refills: 0 | Status: SHIPPED | OUTPATIENT
Start: 2020-10-26 | End: 2021-10-11 | Stop reason: SDUPTHER

## 2020-10-26 NOTE — PROGRESS NOTES
Douglas Eagle is a 62 y.o. female, evaluated on 10/26/2020 for Annual Wellness Visit in office. .    Assessment & Plan:   Diagnoses and all orders for this visit:    1. Medicare annual wellness visit, subsequent  -     [place] 3 15 MIN  -     [facility] MAMMO BI SCREENING INCL CAD; Future  -     INFLUENZA VIRUS VACCINE, QUADRIVALENT (RIV4), DERIVED FROM RECOMBINANT DNA,HEMAGGLUTININ(HA) PROTEIN ONLY, PF ABX FREE    2. Screening for depression  -     DEPRESSION SCREEN ANNUAL  -Positive for anxiety     3. Screening for alcoholism  -Negative screen     4. Encounter for screening mammogram for malignant neoplasm of breast  -     [facility] MAMMO BI SCREENING INCL CAD; Future    5. Screen for colon cancer  -     REFERRAL FOR COLONOSCOPY    6. Anxiety  -Uncontrolled  -She will start medication as prescribed and follow-up in 1 month, sooner if needed. Anxiety she reports is related to her job. -     hydrOXYzine pamoate (VISTARIL) 25 mg capsule; Take 1 Cap by mouth three (3) times daily as needed for Anxiety for up to 30 doses. 12  Subjective:   Patient is here today for annual medicare wellness exam. She complains today of anxiety that is not controlled with prescribed medications. Current treatment includes Celexa and no other therapies. Ongoing symptoms include: palpitations, racing thoughts, feelings of losing control, difficulty concentrating. Patient denies: chest pain, shortness of breath, dizziness, suicidal ideation, homocidal ideation. Prior to Admission medications    Medication Sig Start Date End Date Taking? Authorizing Provider   topiramate (TOPAMAX) 25 mg tablet 3 tabs PO QHS 9/21/20   Marjorie Anglin NP   citalopram (CELEXA) 40 mg tablet Take 1 Tab by mouth daily. 9/21/20   Marjorie Anglin NP   atorvastatin (LIPITOR) 20 mg tablet Take 1 Tab by mouth daily.  9/21/20   Marjorie Anglin NP   traZODone (DESYREL) 100 mg tablet Take 1 Tab by mouth nightly. 9/21/20   Dontrell Garvin NP         Review of Systems   Constitutional: Negative for chills, fever, malaise/fatigue and weight loss. Eyes: Negative for blurred vision and double vision. Respiratory: Negative for cough and shortness of breath. Cardiovascular: Negative for chest pain and leg swelling. Gastrointestinal: Negative for abdominal pain, constipation, diarrhea, heartburn, nausea and vomiting. Genitourinary: Negative. Musculoskeletal: Negative for myalgias. Skin: Negative for rash. Neurological: Negative for dizziness and headaches. Psychiatric/Behavioral: Negative for depression, hallucinations, substance abuse and suicidal ideas. The patient is nervous/anxious. The patient does not have insomnia. Physical Exam  Vitals signs reviewed. Constitutional:       Appearance: Normal appearance. HENT:      Head: Normocephalic and atraumatic. Cardiovascular:      Rate and Rhythm: Normal rate and regular rhythm. Pulmonary:      Effort: Pulmonary effort is normal.      Breath sounds: Normal breath sounds. Abdominal:      General: Abdomen is flat. Skin:     General: Skin is warm. Capillary Refill: Capillary refill takes less than 2 seconds. Neurological:      Mental Status: She is alert. Psychiatric:         Mood and Affect: Mood normal.         Behavior: Behavior normal.         Thought Content:  Thought content normal.         Judgment: Judgment normal.           Shaheen Wise NP

## 2020-10-26 NOTE — PATIENT INSTRUCTIONS
Medicare Wellness Visit, Female     The best way to live healthy is to have a lifestyle where you eat a well-balanced diet, exercise regularly, limit alcohol use, and quit all forms of tobacco/nicotine, if applicable. Regular preventive services are another way to keep healthy. Preventive services (vaccines, screening tests, monitoring & exams) can help personalize your care plan, which helps you manage your own care. Screening tests can find health problems at the earliest stages, when they are easiest to treat. [facility] follows the current, evidence-based guidelines published by the [facility] Wilmar Kirk (RUSTSTF) when recommending preventive services for our patients. Because we follow these guidelines, sometimes recommendations change over time as research supports it. (For example, mammograms used to be recommended annually. Even though Medicare will still pay for an annual mammogram, the newer guidelines recommend a mammogram every two years for women of average risk). Of course, you and your doctor may decide to screen more often for some diseases, based on your risk and your co-morbidities (chronic disease you are already diagnosed with). Preventive services for you include:  - Medicare offers their members a free annual wellness visit, which is time for you and your primary care provider to discuss and plan for your preventive service needs. Take advantage of this benefit every year!  -All adults over the age of 72 should receive the recommended pneumonia vaccines. Current USPSTF guidelines recommend a series of two vaccines for the best pneumonia protection.   -All adults should have a flu vaccine yearly and a tetanus vaccine every 10 years.   -All adults age 48 and older should receive the shingles vaccines (series of two vaccines).       -All adults age 38-68 who are overweight should have a diabetes screening test once every three years.   -All adults born between [birth year] and 1965 should be screened once for Hepatitis C.  -Other screening tests and preventive services for persons with diabetes include: an eye exam to screen for diabetic retinopathy, a kidney function test, a foot exam, and stricter control over your cholesterol.   -Cardiovascular screening for adults with routine risk involves an electrocardiogram (ECG) at intervals determined by your doctor.   -Colorectal cancer screenings should be done for adults age 54-65 with no increased risk factors for colorectal cancer. There are a number of acceptable methods of screening for this type of cancer. Each test has its own benefits and drawbacks. Discuss with your doctor what is most appropriate for you during your annual wellness visit. The different tests include: colonoscopy (considered the best screening method), a fecal occult blood test, a fecal DNA test, and sigmoidoscopy.    -A bone mass density test is recommended when a woman turns 65 to screen for osteoporosis. This test is only recommended one time, as a screening. Some providers will use this same test as a disease monitoring tool if you already have osteoporosis. -Breast cancer screenings are recommended every other year for women of normal risk, age 54-69.  -Cervical cancer screenings for women over age 72 are only recommended with certain risk factors.      Here is a list of your current Health Maintenance items (your personalized list of preventive services) with a due date:  Health Maintenance Due   Topic Date Due    Hepatitis C Test  1955    DTaP/Tdap/Td  (1 - Tdap) 12/14/1977    Pap Test  12/14/1977    Shingles Vaccine (1 of 2) 12/14/2006    Colorectal Screening  12/14/2006    Mammogram  12/14/2006    Yearly Flu Vaccine (1) 09/01/2020

## 2020-10-26 NOTE — PROGRESS NOTES
This is the Subsequent Medicare Annual Wellness Exam, performed 12 months or more after the Initial AWV or the last Subsequent AWV    I have reviewed the patient's medical history in detail and updated the computerized patient record. History     Patient Active Problem List   Diagnosis Code    Cervical pain M54.2    Lumbago M54.5    Notalgia M54.9    Cervical spinal stenosis M48.02    Cervical neuritis M54.12    DDD (degenerative disc disease), cervical M50.30    Spinal stenosis, cervical region M48.02    Radiculopathy, cervical region M54.12    Other cervical disc degeneration, unspecified cervical region M50.30    Spinal stenosis in cervical region M48.02    Radiculopathy, cervical M54.12    Spinal stenosis, cervicothoracic region M48.03    Primary osteoarthritis, right hand M19.041    Depression, major, recurrent, moderate ([facility] [address] F33.1     Past Medical History:   Diagnosis Date    Degenerative disc disease, cervical     Psychiatric disorder     Scoliosis     Spinal stenosis in cervical region       Past Surgical History:   Procedure Laterality Date    HX HEENT      sinus surgery    HX HEMORRHOIDECTOMY      HX ORTHOPAEDIC      spinal fusion     SPINAL FUSION,ANT,EA ADNL LEVEL       Current Outpatient Medications   Medication Sig Dispense Refill    topiramate (TOPAMAX) 25 mg tablet 3 tabs PO  Tab 1    citalopram (CELEXA) 40 mg tablet Take 1 Tab by mouth daily. 90 Tab 1    atorvastatin (LIPITOR) 20 mg tablet Take 1 Tab by mouth daily. 90 Tab 1    traZODone (DESYREL) 100 mg tablet Take 1 Tab by mouth nightly.  90 Tab 1     Allergies   Allergen Reactions    Sulfa (Sulfonamide Antibiotics) Anaphylaxis       Family History   Problem Relation Age of Onset    Heart Disease Father     Lung Disease Father     COPD Mother      Social History     Tobacco Use    Smoking status: Former Smoker     Last attempt to quit:      Years since quittin.8    Smokeless tobacco: Never Used Substance Use Topics    Alcohol use: No     Alcohol/week: 0.0 standard drinks       Depression Risk Factor Screening:     3 most recent PHQ Screens 9/21/2020   PHQ Not Done -   Little interest or pleasure in doing things Nearly every day   Feeling down, depressed, irritable, or hopeless Nearly every day   Total Score PHQ 2 6   Trouble falling or staying asleep, or sleeping too much Nearly every day   Feeling tired or having little energy Nearly every day   Poor appetite, weight loss, or overeating Not at all   Feeling bad about yourself - or that you are a failure or have let yourself or your family down Nearly every day   Trouble concentrating on things such as school, work, reading, or watching TV Not at all   Moving or speaking so slowly that other people could have noticed; or the opposite being so fidgety that others notice Nearly every day   Thoughts of being better off dead, or hurting yourself in some way Not at all   PHQ 9 Score 18   How difficult have these problems made it for you to do your work, take care of your home and get along with others Somewhat difficult       Alcohol Risk Screen   Do you average more than 1 drink per night or more than 7 drinks a week:  No    On any one occasion in the past three months have you have had more than 3 drinks containing alcohol:  No        Functional Ability and Level of Safety:   Hearing: Hearing is good. Activities of Daily Living: The home contains: no safety equipment. Patient does total self care     Ambulation: with no difficulty     Fall Risk:  Fall Risk Assessment, last 12 mths 9/21/2020   Able to walk? Yes   Fall in past 12 months?  No     Abuse Screen:  Patient is not abused       Cognitive Screening   Has your family/caregiver stated any concerns about your memory: no     Cognitive Screening: Normal - Mini Cog Test    Patient Care Team   Patient Care Team:  Mark Rubio NP as PCP - General (Nurse Practitioner)  Mark Rubio NP as PCP - [facility] Empaneled Provider  Shad Crowe MD (Physical Medicine and Rehabilitation)    Assessment/Plan   Education and counseling provided:  Are appropriate based on today's review and evaluation    Diagnoses and all orders for this visit:    1. Medicare annual wellness visit, subsequent  -     [place] 3 15 MIN  -     [facility] MAMMO BI SCREENING INCL CAD; Future    2. Screening for depression  -     DEPRESSION SCREEN ANNUAL    3. Screening for alcoholism    4. Encounter for screening mammogram for malignant neoplasm of breast  -     [facility] MAMMO BI SCREENING INCL CAD;  Future        Health Maintenance Due   Topic Date Due    Hepatitis C Screening  1955    DTaP/Tdap/Td series (1 - Tdap) 12/14/1977    PAP AKA CERVICAL CYTOLOGY  12/14/1977    Shingrix Vaccine Age 50> (1 of 2) 12/14/2006    Colorectal Cancer Screening Combo  12/14/2006    Breast Cancer Screen Mammogram  12/14/2006    Flu Vaccine (1) 09/01/2020

## 2020-10-27 PROBLEM — F41.9 ANXIETY: Status: ACTIVE | Noted: 2020-10-27

## 2021-03-11 RX ORDER — TOPIRAMATE 25 MG/1
TABLET ORAL
Qty: 270 TAB | Refills: 0 | Status: SHIPPED | OUTPATIENT
Start: 2021-03-11 | End: 2021-03-12

## 2021-03-12 RX ORDER — TOPIRAMATE 25 MG/1
TABLET ORAL
Qty: 270 TAB | Refills: 0 | Status: SHIPPED | OUTPATIENT
Start: 2021-03-12 | End: 2021-10-01 | Stop reason: SDUPTHER

## 2021-03-25 ENCOUNTER — HOSPITAL ENCOUNTER (EMERGENCY)
Age: 66
Discharge: HOME | End: 2021-03-25
Attending: FAMILY MEDICINE
Payer: COMMERCIAL

## 2021-03-25 VITALS
RESPIRATION RATE: 18 BRPM | DIASTOLIC BLOOD PRESSURE: 79 MMHG | WEIGHT: 108 LBS | HEART RATE: 79 BPM | HEIGHT: 62 IN | BODY MASS INDEX: 19.88 KG/M2 | OXYGEN SATURATION: 98 % | TEMPERATURE: 98.9 F | SYSTOLIC BLOOD PRESSURE: 125 MMHG

## 2021-03-25 DIAGNOSIS — R51.9 ACUTE NONINTRACTABLE HEADACHE, UNSPECIFIED HEADACHE TYPE: Primary | ICD-10-CM

## 2021-03-25 DIAGNOSIS — J01.90 ACUTE NON-RECURRENT SINUSITIS, UNSPECIFIED LOCATION: ICD-10-CM

## 2021-03-25 PROCEDURE — 74011250637 HC RX REV CODE- 250/637: Performed by: FAMILY MEDICINE

## 2021-03-25 PROCEDURE — 74011250636 HC RX REV CODE- 250/636: Performed by: FAMILY MEDICINE

## 2021-03-25 PROCEDURE — 96372 THER/PROPH/DIAG INJ SC/IM: CPT

## 2021-03-25 PROCEDURE — 99284 EMERGENCY DEPT VISIT MOD MDM: CPT

## 2021-03-25 RX ORDER — KETOROLAC TROMETHAMINE 30 MG/ML
30 INJECTION, SOLUTION INTRAMUSCULAR; INTRAVENOUS
Status: COMPLETED | OUTPATIENT
Start: 2021-03-25 | End: 2021-03-25

## 2021-03-25 RX ORDER — ONDANSETRON 4 MG/1
4 TABLET, ORALLY DISINTEGRATING ORAL
Status: COMPLETED | OUTPATIENT
Start: 2021-03-25 | End: 2021-03-25

## 2021-03-25 RX ORDER — AMOXICILLIN 875 MG/1
875 TABLET, FILM COATED ORAL 2 TIMES DAILY
Qty: 20 TAB | Refills: 0 | Status: SHIPPED | OUTPATIENT
Start: 2021-03-25 | End: 2021-04-04

## 2021-03-25 RX ORDER — KETOROLAC TROMETHAMINE 10 MG/1
10 TABLET, FILM COATED ORAL
Qty: 10 TAB | Refills: 0 | Status: SHIPPED | OUTPATIENT
Start: 2021-03-25 | End: 2021-10-11

## 2021-03-25 RX ADMIN — KETOROLAC TROMETHAMINE 30 MG: 30 INJECTION, SOLUTION INTRAMUSCULAR at 13:33

## 2021-03-25 RX ADMIN — ONDANSETRON 4 MG: 4 TABLET, ORALLY DISINTEGRATING ORAL at 13:33

## 2021-03-25 NOTE — ED PROVIDER NOTES
EMERGENCY DEPARTMENT HISTORY AND PHYSICAL EXAM      Date: 3/25/2021  Patient Name: Laila Person    History of Presenting Illness     Chief Complaint   Patient presents with    Headache       History Provided By: Patient    HPI: Laila Person, 60 y.o. female with a past medical history significant No significant past medical history presents to the ED with cc of headache. The patient states that she started with a headache last night. She has a history of migraines and takes Topamax. The headache is a 10 out of 10. It is located on the left side of her head. She said it started behind her eye. The pain is described as sharp. She has had nausea today and has not taken any meds because of the nausea. [place] any fevers or chills. There are no other complaints, changes, or physical findings at this time. PCP: Jalil Isidro NP    No current facility-administered medications on file prior to encounter. Current Outpatient Medications on File Prior to Encounter   Medication Sig Dispense Refill    topiramate (TOPAMAX) 25 mg tablet TAKE 3 TABLETS BY MOUTH EVERY DAY AT BEDTIME 270 Tab 0    hydrOXYzine pamoate (VISTARIL) 25 mg capsule Take 1 Cap by mouth three (3) times daily as needed for Anxiety for up to 30 doses. 30 Cap 0    citalopram (CELEXA) 40 mg tablet Take 1 Tab by mouth daily. 90 Tab 1    atorvastatin (LIPITOR) 20 mg tablet Take 1 Tab by mouth daily. 90 Tab 1    traZODone (DESYREL) 100 mg tablet Take 1 Tab by mouth nightly.  80 Tab 1       Past History     Past Medical History:  Past Medical History:   Diagnosis Date    Degenerative disc disease, cervical     Psychiatric disorder     Scoliosis     Spinal stenosis in cervical region        Past Surgical History:  Past Surgical History:   Procedure Laterality Date    HX COLONOSCOPY  2015    HX HEENT      sinus surgery    HX HEMORRHOIDECTOMY      HX HEMORRHOIDECTOMY      HX ORTHOPAEDIC      spinal fusion    [facility] ADNL LEVEL         Family History:  Family History   Problem Relation Age of Onset    Heart Disease Father     Lung Disease Father     COPD Mother        Social History:  Social History     Tobacco Use    Smoking status: Former Smoker     Quit date:      Years since quittin.2    Smokeless tobacco: Never Used   Substance Use Topics    Alcohol use: No     Alcohol/week: 0.0 standard drinks    Drug use: No       Allergies: Allergies   Allergen Reactions    Sulfa (Sulfonamide Antibiotics) Anaphylaxis         Review of Systems     Review of Systems   Constitutional: Negative for fatigue and fever. HENT: Negative for rhinorrhea and sore throat. Respiratory: Negative for cough and shortness of breath. Cardiovascular: Negative for chest pain and palpitations. Gastrointestinal: Negative for abdominal pain, diarrhea, nausea and vomiting. Genitourinary: Negative for difficulty urinating and dysuria. Musculoskeletal: Negative for arthralgias and myalgias. Skin: Negative for color change and rash. Neurological: Negative for light-headedness and headaches. Physical Exam     Physical Exam  Vitals signs and nursing note reviewed. Constitutional:       General: She is awake. She is in acute distress. Appearance: Normal appearance. She is well-developed and normal weight. She is not ill-appearing, toxic-appearing or diaphoretic. Interventions: Face mask in place. HENT:      Head: Normocephalic and atraumatic. Eyes:      Conjunctiva/sclera: Conjunctivae normal.      Pupils: Pupils are equal, round, and reactive to light. Neck:      Musculoskeletal: Normal range of motion and neck supple. Cardiovascular:      Rate and Rhythm: Normal rate and regular rhythm. Pulses: Normal pulses. Heart sounds: Normal heart sounds. Pulmonary:      Effort: Pulmonary effort is normal.      Breath sounds: Normal breath sounds. Abdominal:      General: Abdomen is flat. Palpations: Abdomen is soft. Tenderness: There is no abdominal tenderness. Skin:     General: Skin is warm and dry. Neurological:      General: No focal deficit present. Mental Status: She is alert and oriented to person, place, and time. GCS: GCS eye subscore is 4. GCS verbal subscore is 5. GCS motor subscore is 6. Psychiatric:         Mood and Affect: Mood and affect normal.         Behavior: Behavior normal. Behavior is cooperative. Thought Content: Thought content normal.         Lab and Diagnostic Study Results     Labs -   No results found for this or any previous visit (from the past 12 hour(s)). Radiologic Studies -   @lastxrresult@  CT Results  (Last 48 hours)    None        CXR Results  (Last 48 hours)    None            Medical Decision Making   - I am the first provider for this patient. - I reviewed the vital signs, available nursing notes, past medical history, past surgical history, family history and social history. - Initial assessment performed. The patients presenting problems have been discussed, and they are in agreement with the care plan formulated and outlined with them. I have encouraged them to ask questions as they arise throughout their visit. Vital Signs-Reviewed the patient's vital signs. Patient Vitals for the past 12 hrs:   Temp Pulse Resp BP SpO2   03/25/21 1326 98.9 °F (37.2 °C) 72 18 (!) 171/64 98 %       Records Reviewed: Nursing Notes    The patient presents with headache with a differential diagnosis of  migraine and sinusitis      ED Course:          Provider Notes (Medical Decision Making):     [facility]     1318 -the patient headache is down to a 6. She is also complaining of facial pressure on the left. She is going to be discharged home on antibiotics and Toradol. Procedures   M edical Decision Makingedical Decision Making      Disposition   Disposition:       DISCHARGE PLAN:  1.    Current Discharge Medication List      CONTINUE these medications which have NOT CHANGED    Details   topiramate (TOPAMAX) 25 mg tablet TAKE 3 TABLETS BY MOUTH EVERY DAY AT BEDTIME  Qty: 270 Tab, Refills: 0      hydrOXYzine pamoate (VISTARIL) 25 mg capsule Take 1 Cap by mouth three (3) times daily as needed for Anxiety for up to 30 doses. Qty: 30 Cap, Refills: 0    Associated Diagnoses: Anxiety      citalopram (CELEXA) 40 mg tablet Take 1 Tab by mouth daily. Qty: 90 Tab, Refills: 1      atorvastatin (LIPITOR) 20 mg tablet Take 1 Tab by mouth daily. Qty: 90 Tab, Refills: 1      traZODone (DESYREL) 100 mg tablet Take 1 Tab by mouth nightly. Qty: 90 Tab, Refills: 1           2. Follow-up Information     Follow up With Specialties Details Why Contact Rakel Cook NP Nurse Practitioner  As needed [facility]  [phone]          3. Return to ED if worse   4. Current Discharge Medication List      START taking these medications    Details   amoxicillin (AMOXIL) 875 mg tablet Take 1 Tab by mouth two (2) times a day for 10 days. Qty: 20 Tab, Refills: 0      ketorolac (TORADOL) 10 mg tablet Take 1 Tab by mouth every six (6) hours as needed for Pain. Qty: 10 Tab, Refills: 0               Diagnosis     Clinical Impression:   1. Acute nonintractable headache, unspecified headache type    2. Acute non-recurrent sinusitis, unspecified location        Attestations:    Palak Anton MD    Please note that this dictation was completed with [employer], the computer voice recognition software. Quite often unanticipated grammatical, syntax, homophones, and other interpretive errors are inadvertently transcribed by the computer software. Please disregard these errors. Please excuse any errors that have escaped final proofreading. Thank you.

## 2021-06-14 RX ORDER — TRAZODONE HYDROCHLORIDE 100 MG/1
100 TABLET ORAL
Qty: 90 TABLET | Refills: 0 | Status: SHIPPED | OUTPATIENT
Start: 2021-06-14 | End: 2021-10-01 | Stop reason: SDUPTHER

## 2021-06-14 RX ORDER — CITALOPRAM 40 MG/1
40 TABLET, FILM COATED ORAL DAILY
Qty: 90 TABLET | Refills: 0 | Status: SHIPPED | OUTPATIENT
Start: 2021-06-14 | End: 2021-10-01 | Stop reason: SDUPTHER

## 2021-09-24 RX ORDER — TRAZODONE HYDROCHLORIDE 100 MG/1
100 TABLET ORAL
Qty: 90 TABLET | Refills: 0 | Status: CANCELLED | OUTPATIENT
Start: 2021-09-24

## 2021-09-24 RX ORDER — TOPIRAMATE 25 MG/1
TABLET ORAL
Qty: 270 TABLET | Refills: 0 | Status: CANCELLED | OUTPATIENT
Start: 2021-09-24

## 2021-09-24 RX ORDER — CITALOPRAM 40 MG/1
40 TABLET, FILM COATED ORAL DAILY
Qty: 90 TABLET | Refills: 0 | Status: CANCELLED | OUTPATIENT
Start: 2021-09-24

## 2021-09-24 NOTE — TELEPHONE ENCOUNTER
Patient called and request refill on medication. Received fax from [employer].  Pt has upcoming appt scheduled w/ you on 10/11/21

## 2021-10-01 DIAGNOSIS — F41.9 ANXIETY AND DEPRESSION: Primary | ICD-10-CM

## 2021-10-01 DIAGNOSIS — G47.00 INSOMNIA, UNSPECIFIED TYPE: ICD-10-CM

## 2021-10-01 DIAGNOSIS — F32.A ANXIETY AND DEPRESSION: Primary | ICD-10-CM

## 2021-10-01 DIAGNOSIS — F41.9 ANXIETY: ICD-10-CM

## 2021-10-01 RX ORDER — CITALOPRAM 40 MG/1
40 TABLET, FILM COATED ORAL DAILY
Qty: 90 TABLET | Refills: 0 | Status: SHIPPED | OUTPATIENT
Start: 2021-10-01 | End: 2022-03-04

## 2021-10-01 RX ORDER — TRAZODONE HYDROCHLORIDE 100 MG/1
100 TABLET ORAL
Qty: 30 TABLET | Refills: 0 | Status: SHIPPED | OUTPATIENT
Start: 2021-10-01 | End: 2021-11-01

## 2021-10-01 RX ORDER — TOPIRAMATE 25 MG/1
TABLET ORAL
Qty: 90 TABLET | Refills: 0 | Status: SHIPPED | OUTPATIENT
Start: 2021-10-01 | End: 2022-01-18

## 2021-10-11 ENCOUNTER — OFFICE VISIT (OUTPATIENT)
Dept: FAMILY MEDICINE CLINIC | Age: 66
End: 2021-10-11
Payer: COMMERCIAL

## 2021-10-11 ENCOUNTER — HOSPITAL ENCOUNTER (OUTPATIENT)
Dept: LAB | Age: 66
Discharge: HOME | End: 2021-10-11
Payer: COMMERCIAL

## 2021-10-11 VITALS
BODY MASS INDEX: 19.2 KG/M2 | SYSTOLIC BLOOD PRESSURE: 140 MMHG | HEART RATE: 74 BPM | WEIGHT: 105 LBS | OXYGEN SATURATION: 99 % | TEMPERATURE: 98.2 F | DIASTOLIC BLOOD PRESSURE: 71 MMHG

## 2021-10-11 DIAGNOSIS — E78.2 MIXED HYPERLIPIDEMIA: Primary | ICD-10-CM

## 2021-10-11 DIAGNOSIS — F41.9 ANXIETY: ICD-10-CM

## 2021-10-11 DIAGNOSIS — Z83.2 FAMILY HISTORY OF ANEMIA: ICD-10-CM

## 2021-10-11 DIAGNOSIS — R51.9 HEADACHE ABOVE THE EYE REGION: ICD-10-CM

## 2021-10-11 DIAGNOSIS — Z83.49 FAMILY HISTORY OF HYPOTHYROIDISM: ICD-10-CM

## 2021-10-11 DIAGNOSIS — Z83.49 FAMILY HISTORY OF B12 DEFICIENCY: ICD-10-CM

## 2021-10-11 DIAGNOSIS — I78.1 SPIDER ANGIOMA: ICD-10-CM

## 2021-10-11 DIAGNOSIS — E55.9 VITAMIN D DEFICIENCY: ICD-10-CM

## 2021-10-11 DIAGNOSIS — Z23 IMMUNIZATION DUE: ICD-10-CM

## 2021-10-11 DIAGNOSIS — Z98.890 HISTORY OF NASAL SURGERY: ICD-10-CM

## 2021-10-11 DIAGNOSIS — E78.2 MIXED HYPERLIPIDEMIA: ICD-10-CM

## 2021-10-11 DIAGNOSIS — R60.0 BILATERAL EDEMA OF LOWER EXTREMITY: ICD-10-CM

## 2021-10-11 DIAGNOSIS — Z82.49 FAMILY HISTORY OF ESSENTIAL HYPERTENSION: ICD-10-CM

## 2021-10-11 LAB
25(OH)D3 SERPL-MCNC: 91.6 NG/ML (ref 30–100)
ALBUMIN SERPL-MCNC: 3.9 G/DL (ref 3.5–4.7)
ALBUMIN/GLOB SERPL: 1.4 {RATIO}
ALP SERPL-CCNC: 61 U/L (ref 38–126)
ALT SERPL-CCNC: 16 U/L (ref 3–52)
ANION GAP SERPL CALC-SCNC: 9 MMOL/L
AST SERPL W P-5'-P-CCNC: 15 U/L (ref 14–74)
BILIRUB SERPL-MCNC: 0.5 MG/DL (ref 0.2–1)
BUN SERPL-MCNC: 11 MG/DL (ref 9–21)
BUN/CREAT SERPL: 14
CA-I BLD-MCNC: 9.1 MG/DL (ref 8.5–10.5)
CHLORIDE SERPL-SCNC: 104 MMOL/L (ref 94–111)
CHOLEST SERPL-MCNC: 218 MG/DL
CO2 SERPL-SCNC: 29 MMOL/L (ref 21–33)
CREAT SERPL-MCNC: 0.8 MG/DL (ref 0.7–1.2)
ERYTHROCYTE [DISTWIDTH] IN BLOOD BY AUTOMATED COUNT: 12.5 % (ref 11.6–14.5)
GLOBULIN SER CALC-MCNC: 2.8 G/DL
GLUCOSE SERPL-MCNC: 93 MG/DL (ref 70–110)
HCT VFR BLD AUTO: 40 % (ref 35–45)
HDLC SERPL-MCNC: 69 MG/DL (ref 40–60)
HDLC SERPL: 3.2 {RATIO} (ref 0–5)
HGB BLD-MCNC: 12.8 G/DL (ref 12–16)
LDLC SERPL CALC-MCNC: 138 MG/DL (ref 0–100)
LIPID PROFILE,FLP: ABNORMAL
MCH RBC QN AUTO: 31.5 PG (ref 24–34)
MCHC RBC AUTO-ENTMCNC: 32 G/DL (ref 31–37)
MCV RBC AUTO: 98.5 FL (ref 78–100)
NRBC # BLD: 0 K/UL (ref 0–0.01)
NRBC BLD-RTO: 0 PER 100 WBC
PLATELET # BLD AUTO: 328 K/UL (ref 135–420)
PMV BLD AUTO: 11 FL (ref 9.2–11.8)
POTASSIUM SERPL-SCNC: 3.9 MMOL/L (ref 3.2–5.1)
PROT SERPL-MCNC: 6.7 G/DL (ref 6.1–8.4)
RBC # BLD AUTO: 4.06 M/UL (ref 4.2–5.3)
SODIUM SERPL-SCNC: 142 MMOL/L (ref 135–145)
TRIGL SERPL-MCNC: 55 MG/DL (ref ?–150)
TSH SERPL DL<=0.05 MIU/L-ACNC: 1.16 UIU/ML (ref 0.35–6.2)
VIT B12 SERPL-MCNC: 538 PG/ML (ref 211–911)
VLDLC SERPL CALC-MCNC: 11 MG/DL
WBC # BLD AUTO: 5.5 K/UL (ref 4.6–13.2)

## 2021-10-11 PROCEDURE — 80061 LIPID PANEL: CPT

## 2021-10-11 PROCEDURE — 90686 IIV4 VACC NO PRSV 0.5 ML IM: CPT | Performed by: NURSE PRACTITIONER

## 2021-10-11 PROCEDURE — 85027 COMPLETE CBC AUTOMATED: CPT

## 2021-10-11 PROCEDURE — 90471 IMMUNIZATION ADMIN: CPT | Performed by: NURSE PRACTITIONER

## 2021-10-11 PROCEDURE — 82306 VITAMIN D 25 HYDROXY: CPT

## 2021-10-11 PROCEDURE — 36415 COLL VENOUS BLD VENIPUNCTURE: CPT

## 2021-10-11 PROCEDURE — 84443 ASSAY THYROID STIM HORMONE: CPT

## 2021-10-11 PROCEDURE — 99214 OFFICE O/P EST MOD 30 MIN: CPT | Performed by: NURSE PRACTITIONER

## 2021-10-11 PROCEDURE — 80053 COMPREHEN METABOLIC PANEL: CPT

## 2021-10-11 PROCEDURE — 82607 VITAMIN B-12: CPT

## 2021-10-11 RX ORDER — ATORVASTATIN CALCIUM 20 MG/1
20 TABLET, FILM COATED ORAL DAILY
Qty: 90 TABLET | Refills: 1 | Status: SHIPPED | OUTPATIENT
Start: 2021-10-11

## 2021-10-11 RX ORDER — HYDROXYZINE PAMOATE 25 MG/1
25 CAPSULE ORAL
Qty: 60 CAPSULE | Refills: 1 | Status: SHIPPED | OUTPATIENT
Start: 2021-10-11

## 2021-10-11 RX ORDER — FUROSEMIDE 20 MG/1
TABLET ORAL
Qty: 30 TABLET | Refills: 1 | Status: SHIPPED | OUTPATIENT
Start: 2021-10-11

## 2021-10-11 NOTE — PROGRESS NOTES
Mick Mcwilliams presents today for   Chief Complaint   Patient presents with   [facility].     c/o having to catch her breathe when she walks and sometimes winded just from talking ( she has copd)     Anxiety     celexa not working       Is someone accompanying this pt? no    Is the patient using any DME equipment during OV? no    Depression Screening:  3 most recent PHQ Screens 10/26/2020   PHQ Not Done -   Little interest or pleasure in doing things Several days   Feeling down, depressed, irritable, or hopeless Several days   Total Score PHQ 2 2   Trouble falling or staying asleep, or sleeping too much -   Feeling tired or having little energy -   Poor appetite, weight loss, or overeating -   Feeling bad about yourself - or that you are a failure or have let yourself or your family down -   Trouble concentrating on things such as school, work, reading, or watching TV -   Moving or speaking so slowly that other people could have noticed; or the opposite being so fidgety that others notice -   Thoughts of being better off dead, or hurting yourself in some way -   PHQ 9 Score -   How difficult have these problems made it for you to do your work, take care of your home and get along with others -       Learning Assessment:  Learning Assessment 10/26/2020   PRIMARY LEARNER Patient   HIGHEST LEVEL OF EDUCATION - PRIMARY LEARNER  GRADUATED HIGH SCHOOL OR GED   BARRIERS PRIMARY LEARNER NONE   CO-LEARNER CAREGIVER No   PRIMARY LANGUAGE ENGLISH   LEARNER PREFERENCE PRIMARY VIDEOS   ANSWERED BY Madonna Beal   RELATIONSHIP SELF       Fall Risk  Fall Risk Assessment, last 12 mths 10/26/2020   Able to walk? Yes   Fall in past 12 months?  No       ADL  ADL Assessment 10/26/2020   Feeding yourself No Help Needed   Getting from bed to chair No Help Needed   Getting dressed No Help Needed   Bathing or showering No Help Needed   Walk across the room (includes cane/walker) No Help Needed   Using the telphone No Help Needed Taking your medications No Help Needed   Preparing meals No Help Needed   Managing money (expenses/bills) No Help Needed   Moderately strenuous housework (laundry) No Help Needed   Shopping for personal items (toiletries/medicines) No Help Needed   Shopping for groceries No Help Needed   Driving No Help Needed   Climbing a flight of stairs No Help Needed   Getting to places beyond walking distances No Help Needed       Travel Screening:    Travel Screening     Question   Response    In the last month, have you been in contact with someone who was confirmed or suspected to have Coronavirus / COVID-19? No / Unsure    Have you had a COVID-19 viral test in the last 14 days? No    Do you have any of the following new or worsening symptoms? None of these    Have you traveled internationally or domestically in the last month? No      Travel History   Travel since 09/11/21     No documented travel since 09/11/21          Health Maintenance reviewed and discussed and ordered per Provider. Health Maintenance Due   Topic Date Due    Flu Vaccine (1) 09/01/2021    Lipid Screen  09/30/2021    Breast Cancer Screen Mammogram  10/14/2021    Bone Densitometry (Dexa) Screening  12/14/2021   . Coordination of Care:  1. Have you been to the ER, urgent care clinic since your last visit? Hospitalized since your last visit? no    2. Have you seen or consulted any other health care providers outside of the [address] [place] [address] [place] since your last visit? Include any pap smears or colon screening.  no

## 2021-10-11 NOTE — PROGRESS NOTES
History of Present Illness  David Collins is a 60 y.o. female who presents today for:    Chief Complaint   Patient presents with   [address]     c/o having to catch her breathe when she walks and sometimes winded just from talking ( she has copd)     Anxiety     celexa not working     Past Medical History  Past Medical History:   Diagnosis Date    Degenerative disc disease, cervical     Psychiatric disorder     Scoliosis     Spinal stenosis in cervical region         Surgical History  Past Surgical History:   Procedure Laterality Date    HX COLONOSCOPY      HX HEENT      sinus surgery    HX HEMORRHOIDECTOMY      HX HEMORRHOIDECTOMY      HX ORTHOPAEDIC      spinal fusion     IN SPINAL FUSION,ANT,EA ADNL LEVEL          Current Medications  Current Outpatient Medications   Medication Sig    citalopram (CELEXA) 40 mg tablet Take 1 Tablet by mouth daily.  traZODone (DESYREL) 100 mg tablet Take 1 Tablet by mouth nightly.  topiramate (TOPAMAX) 25 mg tablet Take 3 tablets at bedtime    hydrOXYzine pamoate (VISTARIL) 25 mg capsule Take 1 Cap by mouth three (3) times daily as needed for Anxiety for up to 30 doses.  atorvastatin (LIPITOR) 20 mg tablet Take 1 Tab by mouth daily.  ketorolac (TORADOL) 10 mg tablet Take 1 Tab by mouth every six (6) hours as needed for Pain. (Patient not taking: Reported on 10/11/2021)     No current facility-administered medications for this visit. Allergies/Drug Reactions  Allergies   Allergen Reactions    Sulfa (Sulfonamide Antibiotics) Anaphylaxis        Family History  Family History   Problem Relation Age of Onset    Heart Disease Father     Lung Disease Father     COPD Mother         Social History  Social History     Tobacco Use    Smoking status: Former Smoker     Quit date:      Years since quittin.7    Smokeless tobacco: Never Used   Substance Use Topics    Alcohol use: No     Alcohol/week: 0.0 standard drinks    Drug use:  No Health Maintenance   Topic Date Due    Flu Vaccine (1) 09/01/2021    Lipid Screen  09/30/2021    Breast Cancer Screen Mammogram  10/14/2021    Bone Densitometry (Dexa) Screening  12/14/2021    Shingrix Vaccine Age 50> (1 of 2) 01/16/2022 (Originally 12/14/2006)    DTaP/Tdap/Td series (1 - Tdap) 10/11/2022 (Originally 12/14/1977)    Cervical cancer screen  01/01/2025 (Originally 12/14/1977)    Colorectal Cancer Screening Combo  06/09/2025    Hepatitis C Screening  Completed    COVID-19 Vaccine  Completed    Pneumococcal 0-64 years  Aged [employer] History   Administered Date(s) Administered    Covid-19, MODERNA, Mrna, Lnp-s, Pf, 100mcg/0.5mL 03/17/2021, 04/14/2021    Influenza Vaccine (Quadrivalent)(>18 Yrs [employer] [id number]) 10/26/2020       Review of Systems  Review of Systems   Constitutional: Negative. HENT: Negative. Eyes: Negative. Respiratory: Positive for shortness of breath. Negative for cough, hemoptysis, sputum production and wheezing. Cardiovascular: Positive for chest pain and leg swelling. Negative for palpitations, orthopnea, claudication and PND. Gastrointestinal: Positive for constipation. Negative for abdominal pain, blood in stool, diarrhea, heartburn, melena, nausea and vomiting. Genitourinary: Negative. Musculoskeletal: Positive for back pain and neck pain. Negative for falls, joint pain and myalgias. Skin: Negative. Neurological: Positive for tingling and headaches. Negative for dizziness, tremors, sensory change, speech change, focal weakness, seizures, loss of consciousness and weakness. Left forehead headache which is relieved with Ibuprofen 600 to 800 mg   Patient reports tingling in bilateral hands related to spinal stenosis for several years. Endo/Heme/Allergies: Positive for environmental allergies. Negative for polydipsia. Bruises/bleeds easily. Psychiatric/Behavioral: Positive for depression.  Negative for hallucinations, memory loss, substance abuse and suicidal ideas. The patient is nervous/anxious and has insomnia. Physical Exam  Vital signs:   Vitals:    10/11/21 0925   BP: (!) 140/71   Pulse: 74   Temp: 98.2 °F (36.8 °C)   TempSrc: Oral   SpO2: 99%   Weight: 105 lb (47.6 kg)     General: alert, oriented, not in distress  Head: scalp normal, atraumatic  Eyes: pupils are equal and reactive, full and intact EOM's  Ears: patent ear canal, intact tympanic membrane  Nose: normal turbinates, no congestion or discharge  Lips/Mouth: moist lips and buccal mucosa, non-enlarged tonsils, pink throat  Neck: supple, no JVD, no lymphadenopathy, non-palpable thyroid  Chest/Lungs: clear breath sounds, no wheezing or crackles  Heart: normal rate, regular rhythm, no murmur  Abdomen: soft, non-distended, non-tender, normal bowel sounds, no organomegaly, no masses  Extremities: no focal deformities, no edema  Skin: no active skin lesions      Laboratory/Tests:  No visits with results within 3 Month(s) from this visit. Latest known visit with results is:   No results found for any previous visit. Patient reports history of cervical spinal stenosis. Patient reports history of corrective and reconstructive surgery of her sinuses in approximately 1999 due to severe sinus infection. She reports history of the left forehead frontal headache which she reports is relieved with OTC Ibuprofen 600 to 800 mg tablet. Will refer to ENT for evaluation at this visit. Patient reports history of bilateral hand tingling which she has had for several years as a result of her spinal stenosis. Physical examination performed. Bilateral ear tympanic membrane clear to otoscopic examination with no abnormalities  observed. Bilateral lung sounds clear to auscultation in all watkins. Bowel sounds normoactive in all 4 quadrants and no pain or tenderness elicited with deep palpation. There is +1 edema in bilateral lower extremities.   There were also visible spider angiomas in bilateral lower extremities. Patient received annual flu vaccine at this visit. Assessment/Plan:    1. Anxiety. Continue Hydroxyzine 25 mg capsules 3 times daily as needed for management of anxiety. 2.  Bilateral lower extremity edema. Prescribed Furosemide 20 mg tablet daily for management of bilateral lower extremity edema. 3.  Mixed hyperlipidemia. Continue Atorvastatin 20 mg tablet daily for management of mixed hyperlipidemia. 4.  Left frontal headache above eye region and history of nasal surgery. Will refer patient to ENT at this time. I have discussed the diagnosis with the patient and the intended plan as seen in the above orders. The patient has received an after-visit summary and questions were answered concerning future plans. I have discussed medication side effects and warnings with the patient as well. I have reviewed the plan of care with the patient, accepted their input and they are in agreement with the treatment goals.        Hailey Hoyt NP  October 11, 2021

## 2021-12-31 DIAGNOSIS — G47.00 INSOMNIA, UNSPECIFIED TYPE: ICD-10-CM

## 2021-12-31 RX ORDER — TRAZODONE HYDROCHLORIDE 100 MG/1
100 TABLET ORAL
Qty: 60 TABLET | Refills: 0 | Status: SHIPPED | OUTPATIENT
Start: 2021-12-31 | End: 2022-03-04

## 2022-01-17 DIAGNOSIS — F41.9 ANXIETY AND DEPRESSION: ICD-10-CM

## 2022-01-17 DIAGNOSIS — F32.A ANXIETY AND DEPRESSION: ICD-10-CM

## 2022-01-17 DIAGNOSIS — F41.9 ANXIETY: ICD-10-CM

## 2022-01-18 RX ORDER — TOPIRAMATE 25 MG/1
TABLET ORAL
Qty: 90 TABLET | Refills: 0 | Status: SHIPPED | OUTPATIENT
Start: 2022-01-18 | End: 2022-03-04

## 2022-03-03 DIAGNOSIS — F41.9 ANXIETY AND DEPRESSION: ICD-10-CM

## 2022-03-03 DIAGNOSIS — G47.00 INSOMNIA, UNSPECIFIED TYPE: ICD-10-CM

## 2022-03-03 DIAGNOSIS — F32.A ANXIETY AND DEPRESSION: ICD-10-CM

## 2022-03-03 DIAGNOSIS — F41.9 ANXIETY: ICD-10-CM

## 2022-03-04 RX ORDER — TOPIRAMATE 25 MG/1
TABLET ORAL
Qty: 90 TABLET | Refills: 0 | Status: SHIPPED | OUTPATIENT
Start: 2022-03-04 | End: 2022-04-05

## 2022-03-04 RX ORDER — CITALOPRAM 40 MG/1
TABLET, FILM COATED ORAL
Qty: 90 TABLET | Refills: 0 | Status: SHIPPED | OUTPATIENT
Start: 2022-03-04 | End: 2022-08-26

## 2022-03-04 RX ORDER — TRAZODONE HYDROCHLORIDE 100 MG/1
100 TABLET ORAL
Qty: 60 TABLET | Refills: 0 | Status: SHIPPED | OUTPATIENT
Start: 2022-03-04 | End: 2022-04-05

## 2022-03-18 PROBLEM — F33.1 DEPRESSION, MAJOR, RECURRENT, MODERATE (HCC): Status: ACTIVE | Noted: 2020-09-21

## 2022-03-18 PROBLEM — F41.9 ANXIETY: Status: ACTIVE | Noted: 2020-10-27

## 2022-03-19 PROBLEM — M48.03 SPINAL STENOSIS, CERVICOTHORACIC REGION: Status: ACTIVE | Noted: 2017-08-22

## 2022-03-19 PROBLEM — M19.041 PRIMARY OSTEOARTHRITIS, RIGHT HAND: Status: ACTIVE | Noted: 2017-08-22

## 2023-05-18 DIAGNOSIS — G44.229 CHRONIC TENSION-TYPE HEADACHE, NOT INTRACTABLE: ICD-10-CM

## 2023-05-18 DIAGNOSIS — F41.1 GENERALIZED ANXIETY DISORDER: ICD-10-CM

## 2023-05-18 DIAGNOSIS — F51.01 PRIMARY INSOMNIA: ICD-10-CM

## 2023-05-18 DIAGNOSIS — E78.2 MIXED HYPERLIPIDEMIA: Primary | ICD-10-CM

## 2023-05-18 RX ORDER — ATORVASTATIN CALCIUM 20 MG/1
20 TABLET, FILM COATED ORAL DAILY
Qty: 90 TABLET | Refills: 3 | Status: SHIPPED | OUTPATIENT
Start: 2023-05-18

## 2023-05-18 RX ORDER — CITALOPRAM 40 MG/1
40 TABLET ORAL DAILY
Qty: 90 TABLET | Refills: 3 | Status: SHIPPED | OUTPATIENT
Start: 2023-05-18

## 2023-05-18 RX ORDER — TRAZODONE HYDROCHLORIDE 100 MG/1
100 TABLET ORAL NIGHTLY
Qty: 90 TABLET | Refills: 3 | Status: SHIPPED | OUTPATIENT
Start: 2023-05-18

## 2023-05-18 RX ORDER — TOPIRAMATE 25 MG/1
TABLET ORAL
Qty: 180 TABLET | Refills: 2 | Status: SHIPPED | OUTPATIENT
Start: 2023-05-18

## 2023-08-25 ENCOUNTER — HOSPITAL ENCOUNTER (OUTPATIENT)
Age: 68
End: 2023-08-25
Payer: COMMERCIAL

## 2023-08-25 ENCOUNTER — OFFICE VISIT (OUTPATIENT)
Facility: CLINIC | Age: 68
End: 2023-08-25
Payer: COMMERCIAL

## 2023-08-25 VITALS
BODY MASS INDEX: 22.56 KG/M2 | RESPIRATION RATE: 18 BRPM | DIASTOLIC BLOOD PRESSURE: 71 MMHG | WEIGHT: 122.6 LBS | TEMPERATURE: 98.7 F | HEART RATE: 80 BPM | HEIGHT: 62 IN | SYSTOLIC BLOOD PRESSURE: 121 MMHG | OXYGEN SATURATION: 96 %

## 2023-08-25 DIAGNOSIS — G62.9 NEUROPATHY: ICD-10-CM

## 2023-08-25 DIAGNOSIS — Z00.00 MEDICARE ANNUAL WELLNESS VISIT, SUBSEQUENT: Primary | ICD-10-CM

## 2023-08-25 DIAGNOSIS — Z13.1 DIABETES MELLITUS SCREENING: ICD-10-CM

## 2023-08-25 DIAGNOSIS — R68.89 OTHER GENERAL SYMPTOMS AND SIGNS: ICD-10-CM

## 2023-08-25 DIAGNOSIS — E78.2 MIXED HYPERLIPIDEMIA: ICD-10-CM

## 2023-08-25 DIAGNOSIS — F51.01 PRIMARY INSOMNIA: ICD-10-CM

## 2023-08-25 DIAGNOSIS — Z11.59 NEED FOR HEPATITIS C SCREENING TEST: ICD-10-CM

## 2023-08-25 DIAGNOSIS — M19.041 PRIMARY OSTEOARTHRITIS, RIGHT HAND: ICD-10-CM

## 2023-08-25 LAB
ALBUMIN SERPL-MCNC: 3.8 G/DL (ref 3.4–5)
ALBUMIN/GLOB SERPL: 1.1 (ref 0.8–1.7)
ALP SERPL-CCNC: 91 U/L (ref 45–117)
ALT SERPL-CCNC: 17 U/L (ref 13–56)
ANION GAP SERPL CALC-SCNC: 4 MMOL/L (ref 3–18)
AST SERPL W P-5'-P-CCNC: 14 U/L (ref 10–38)
BASOPHILS # BLD: 0.1 K/UL (ref 0–0.1)
BASOPHILS NFR BLD: 1 % (ref 0–2)
BILIRUB SERPL-MCNC: 0.4 MG/DL (ref 0.2–1)
BUN SERPL-MCNC: 14 MG/DL (ref 7–18)
BUN/CREAT SERPL: 15 (ref 12–20)
CA-I BLD-MCNC: 8.8 MG/DL (ref 8.5–10.1)
CHLORIDE SERPL-SCNC: 109 MMOL/L (ref 100–111)
CHOLEST SERPL-MCNC: 269 MG/DL
CO2 SERPL-SCNC: 26 MMOL/L (ref 21–32)
CREAT SERPL-MCNC: 0.94 MG/DL (ref 0.6–1.3)
DIFFERENTIAL METHOD BLD: ABNORMAL
EOSINOPHIL # BLD: 0 K/UL (ref 0–0.4)
EOSINOPHIL NFR BLD: 1 % (ref 0–5)
ERYTHROCYTE [DISTWIDTH] IN BLOOD BY AUTOMATED COUNT: 12.9 % (ref 11.6–14.5)
EST. AVERAGE GLUCOSE BLD GHB EST-MCNC: 108 MG/DL
GLOBULIN SER CALC-MCNC: 3.5 G/DL (ref 2–4)
GLUCOSE SERPL-MCNC: 100 MG/DL (ref 74–99)
HBA1C MFR BLD: 5.4 % (ref 4.2–5.6)
HCT VFR BLD AUTO: 44.8 % (ref 35–45)
HDLC SERPL-MCNC: 73 MG/DL (ref 40–60)
HDLC SERPL: 3.7 (ref 0–5)
HGB BLD-MCNC: 14.4 G/DL (ref 12–16)
IMM GRANULOCYTES # BLD AUTO: 0 K/UL (ref 0–0.04)
IMM GRANULOCYTES NFR BLD AUTO: 0 % (ref 0–0.5)
LDLC SERPL CALC-MCNC: 179.4 MG/DL (ref 0–100)
LIPID PANEL: ABNORMAL
LYMPHOCYTES # BLD: 1.3 K/UL (ref 0.9–3.6)
LYMPHOCYTES NFR BLD: 18 % (ref 21–52)
MCH RBC QN AUTO: 30.6 PG (ref 24–34)
MCHC RBC AUTO-ENTMCNC: 32.1 G/DL (ref 31–37)
MCV RBC AUTO: 95.3 FL (ref 78–100)
MONOCYTES # BLD: 0.6 K/UL (ref 0.05–1.2)
MONOCYTES NFR BLD: 8 % (ref 3–10)
NEUTS SEG # BLD: 5.2 K/UL (ref 1.8–8)
NEUTS SEG NFR BLD: 72 % (ref 40–73)
NRBC # BLD: 0 K/UL (ref 0–0.01)
NRBC BLD-RTO: 0 PER 100 WBC
PLATELET # BLD AUTO: 368 K/UL (ref 135–420)
PMV BLD AUTO: 9.9 FL (ref 9.2–11.8)
POTASSIUM SERPL-SCNC: 3.7 MMOL/L (ref 3.5–5.5)
PROT SERPL-MCNC: 7.3 G/DL (ref 6.4–8.2)
RBC # BLD AUTO: 4.7 M/UL (ref 4.2–5.3)
SODIUM SERPL-SCNC: 139 MMOL/L (ref 136–145)
TRIGL SERPL-MCNC: 83 MG/DL
TSH SERPL DL<=0.05 MIU/L-ACNC: 0.92 UIU/ML (ref 0.36–3.74)
VIT B12 SERPL-MCNC: 365 PG/ML (ref 211–911)
VLDLC SERPL CALC-MCNC: 16.6 MG/DL
WBC # BLD AUTO: 7.2 K/UL (ref 4.6–13.2)

## 2023-08-25 PROCEDURE — 36415 COLL VENOUS BLD VENIPUNCTURE: CPT

## 2023-08-25 PROCEDURE — 85025 COMPLETE CBC W/AUTO DIFF WBC: CPT

## 2023-08-25 PROCEDURE — G0439 PPPS, SUBSEQ VISIT: HCPCS | Performed by: NURSE PRACTITIONER

## 2023-08-25 PROCEDURE — 99214 OFFICE O/P EST MOD 30 MIN: CPT | Performed by: NURSE PRACTITIONER

## 2023-08-25 PROCEDURE — 1123F ACP DISCUSS/DSCN MKR DOCD: CPT | Performed by: NURSE PRACTITIONER

## 2023-08-25 PROCEDURE — 83036 HEMOGLOBIN GLYCOSYLATED A1C: CPT

## 2023-08-25 PROCEDURE — 80053 COMPREHEN METABOLIC PANEL: CPT

## 2023-08-25 PROCEDURE — 82607 VITAMIN B-12: CPT

## 2023-08-25 PROCEDURE — 86803 HEPATITIS C AB TEST: CPT

## 2023-08-25 PROCEDURE — 84443 ASSAY THYROID STIM HORMONE: CPT

## 2023-08-25 PROCEDURE — 80061 LIPID PANEL: CPT

## 2023-08-25 SDOH — ECONOMIC STABILITY: HOUSING INSECURITY
IN THE LAST 12 MONTHS, WAS THERE A TIME WHEN YOU DID NOT HAVE A STEADY PLACE TO SLEEP OR SLEPT IN A SHELTER (INCLUDING NOW)?: NO

## 2023-08-25 SDOH — ECONOMIC STABILITY: FOOD INSECURITY: WITHIN THE PAST 12 MONTHS, THE FOOD YOU BOUGHT JUST DIDN'T LAST AND YOU DIDN'T HAVE MONEY TO GET MORE.: NEVER TRUE

## 2023-08-25 SDOH — ECONOMIC STABILITY: FOOD INSECURITY: WITHIN THE PAST 12 MONTHS, YOU WORRIED THAT YOUR FOOD WOULD RUN OUT BEFORE YOU GOT MONEY TO BUY MORE.: NEVER TRUE

## 2023-08-25 SDOH — ECONOMIC STABILITY: INCOME INSECURITY: HOW HARD IS IT FOR YOU TO PAY FOR THE VERY BASICS LIKE FOOD, HOUSING, MEDICAL CARE, AND HEATING?: NOT HARD AT ALL

## 2023-08-25 ASSESSMENT — PATIENT HEALTH QUESTIONNAIRE - PHQ9
2. FEELING DOWN, DEPRESSED OR HOPELESS: 1
10. IF YOU CHECKED OFF ANY PROBLEMS, HOW DIFFICULT HAVE THESE PROBLEMS MADE IT FOR YOU TO DO YOUR WORK, TAKE CARE OF THINGS AT HOME, OR GET ALONG WITH OTHER PEOPLE: 0
4. FEELING TIRED OR HAVING LITTLE ENERGY: 0
7. TROUBLE CONCENTRATING ON THINGS, SUCH AS READING THE NEWSPAPER OR WATCHING TELEVISION: 0
SUM OF ALL RESPONSES TO PHQ QUESTIONS 1-9: 7
8. MOVING OR SPEAKING SO SLOWLY THAT OTHER PEOPLE COULD HAVE NOTICED. OR THE OPPOSITE, BEING SO FIGETY OR RESTLESS THAT YOU HAVE BEEN MOVING AROUND A LOT MORE THAN USUAL: 0
3. TROUBLE FALLING OR STAYING ASLEEP: 3
SUM OF ALL RESPONSES TO PHQ QUESTIONS 1-9: 7
9. THOUGHTS THAT YOU WOULD BE BETTER OFF DEAD, OR OF HURTING YOURSELF: 0
SUM OF ALL RESPONSES TO PHQ9 QUESTIONS 1 & 2: 3
6. FEELING BAD ABOUT YOURSELF - OR THAT YOU ARE A FAILURE OR HAVE LET YOURSELF OR YOUR FAMILY DOWN: 0
1. LITTLE INTEREST OR PLEASURE IN DOING THINGS: 2
5. POOR APPETITE OR OVEREATING: 1

## 2023-08-25 ASSESSMENT — LIFESTYLE VARIABLES
HOW OFTEN DO YOU HAVE A DRINK CONTAINING ALCOHOL: NEVER
HOW MANY STANDARD DRINKS CONTAINING ALCOHOL DO YOU HAVE ON A TYPICAL DAY: PATIENT DOES NOT DRINK

## 2023-08-25 NOTE — PROGRESS NOTES
History of Present Illness  Amanda Allen is a 78 y.o. female who presents today for:    Chief Complaint   Patient presents with    Follow-up       Past Medical History  Past Medical History:   Diagnosis Date    Degenerative disc disease, cervical     Psychiatric disorder     Scoliosis     Spinal stenosis in cervical region         Surgical History  Past Surgical History:   Procedure Laterality Date    COLONOSCOPY  2015    HEENT      sinus surgery    HEMORRHOID SURGERY      HEMORRHOID SURGERY      ORTHOPEDIC SURGERY      spinal fusion     SPINAL FUSION,ANT,EA ADNL LEVEL          Current Medications  Current Outpatient Medications   Medication Sig    atorvastatin (LIPITOR) 20 MG tablet Take 1 tablet by mouth daily    citalopram (CELEXA) 40 MG tablet Take 1 tablet by mouth daily    topiramate (TOPAMAX) 25 MG tablet TAKE 3 TABLETS BY MOUTH AT BEDTIME    traZODone (DESYREL) 100 MG tablet Take 1 tablet by mouth nightly    furosemide (LASIX) 20 MG tablet Tae 1 tablet daily for edema    hydrOXYzine pamoate (VISTARIL) 25 MG capsule Take 1 capsule by mouth 3 times daily as needed     No current facility-administered medications for this visit.        Allergies/Drug Reactions  Allergies   Allergen Reactions    Sulfa Antibiotics Anaphylaxis        Family History  Family History   Problem Relation Age of Onset    COPD Mother     Lung Disease Father     Heart Disease Father         Social History  Social History     Tobacco Use    Smoking status: Former     Packs/day: 0.50     Years: 20.00     Pack years: 10.00     Types: Cigarettes     Quit date: 1/1/2013     Years since quitting: 10.6    Smokeless tobacco: Never   Substance Use Topics    Alcohol use: No     Alcohol/week: 0.0 standard drinks    Drug use: No        Health Maintenance   Topic Date Due    Hepatitis C screen  Never done    DTaP/Tdap/Td vaccine (1 - Tdap) Never done    Shingles vaccine (1 of 2) Never done    DEXA (modify frequency per FRAX score)  Never done

## 2023-08-28 LAB
HCV AB SER IA-ACNC: <0.02 INDEX
HCV AB SERPL QL IA: NEGATIVE
HEPATITIS C COMMENT: NORMAL

## 2023-08-29 DIAGNOSIS — E78.2 MIXED HYPERLIPIDEMIA: ICD-10-CM

## 2023-08-29 NOTE — PROGRESS NOTES
Spoke with patient via phone call on 8/29/2023 of laboratory results. Patient's Lipid panel was elevated and she reports she has not consistently taken her prescribed Atorvastatin 20 mg tablet nightly. Patient reports she will add to daily morning regimen and will repeat Lipid panel at patient's next visit February 2024.

## 2023-10-05 ENCOUNTER — TELEMEDICINE (OUTPATIENT)
Facility: CLINIC | Age: 68
End: 2023-10-05
Payer: COMMERCIAL

## 2023-10-05 DIAGNOSIS — F33.1 DEPRESSION, MAJOR, RECURRENT, MODERATE (HCC): Primary | ICD-10-CM

## 2023-10-05 DIAGNOSIS — F51.01 PRIMARY INSOMNIA: ICD-10-CM

## 2023-10-05 DIAGNOSIS — E78.2 MIXED HYPERLIPIDEMIA: ICD-10-CM

## 2023-10-05 PROCEDURE — 99214 OFFICE O/P EST MOD 30 MIN: CPT | Performed by: NURSE PRACTITIONER

## 2023-10-05 PROCEDURE — 1123F ACP DISCUSS/DSCN MKR DOCD: CPT | Performed by: NURSE PRACTITIONER

## 2023-10-05 NOTE — PROGRESS NOTES
Chester Goodell, was evaluated through a synchronous (real-time) audio-video encounter. The patient (or guardian if applicable) is aware that this is a billable service, which includes applicable co-pays. This Virtual Visit was conducted with patient's (and/or legal guardian's) consent. Patient identification was verified, and a caregiver was present when appropriate. The patient was located at Home: [address] [address]  Provider was located at Facility (Appt Dept): [facility], [address],  [address] [facility]      Chester Goodell (:  1955) is a Established patient, presenting virtually for evaluation of the following:    Assessment & Plan   Depression, major, recurrent, moderate ([address] Continue Citalopram 40 mg tablet daily for management of Depression, major, recurrent, moderate ([address]). Mixed hyperlipidemia. Continue Atorvastatin 20 mg tablet daily for management of mixed hyperlipidemia. Primary insomnia. Continue Trazodone 100 mg tablet nightly for management of primary insomnia. Objective   This visit was made by this provider to speak with patient of the stresses associated with care of her elderly mother with dementia. 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 Patient's mother has dementia with visual and auditory hallucinations. Patient mother is very frail and has required increased amount of hospital visits due to her chronic conditions. Patient reports she does not have a plan in place for her mother's long-term care. The patient during this visit that she should have plan for her mother's long-term care, since patient is the only caregiver for her mother. Patient expressed understanding.     --MILAGROS Varghese - NP

## 2023-10-05 NOTE — PROGRESS NOTES
Kurt Lujan presents today for   Chief Complaint   Patient presents with    Follow-up     1m f/u         Health Maintenance reviewed and discussed and ordered per Provider. Health Maintenance Due   Topic Date Due    DTaP/Tdap/Td vaccine (1 - Tdap) Never done    Shingles vaccine (1 of 2) Never done    DEXA (modify frequency per FRAX score)  Never done    COVID-19 Vaccine (3 - Moderna series) 06/09/2021    Breast cancer screen  10/14/2021    Pneumococcal 65+ years Vaccine (1 - PCV) Never done    Flu vaccine (1) 08/01/2023   . Coordination of Care:  1. \"Have you been to the ER, urgent care clinic since your last visit? Hospitalized since your last visit? \" No    2. \"Have you seen or consulted any other health care providers outside of the [address] [place] [address] since your last visit? \" No    3. For patients aged 43-73: Has the patient had a colonoscopy? Yes- No care gap present    If the patient is female:    4. For patients aged 43-66: Has the patient had a mammogram within the past 2 years? No    5. For patients aged 21-65: Has the patient had a pap smear?  N/A based on age/sex

## 2023-10-31 ENCOUNTER — TELEPHONE (OUTPATIENT)
Facility: CLINIC | Age: 68
End: 2023-10-31

## 2023-10-31 NOTE — TELEPHONE ENCOUNTER
Patient called asking if you would give her a call back. She only wanted to speak to you.    [phone]

## 2023-12-05 ENCOUNTER — TELEPHONE (OUTPATIENT)
Facility: CLINIC | Age: 68
End: 2023-12-05

## 2023-12-05 DIAGNOSIS — J01.90 ACUTE NON-RECURRENT SINUSITIS, UNSPECIFIED LOCATION: Primary | ICD-10-CM

## 2023-12-05 NOTE — TELEPHONE ENCOUNTER
Patient called stating she has a sinus infection. She wants to know if you'd be willing to send a script into [email or web address] for her.    [phone]

## 2023-12-06 ENCOUNTER — TELEPHONE (OUTPATIENT)
Facility: CLINIC | Age: 68
End: 2023-12-06

## 2023-12-06 RX ORDER — AZITHROMYCIN 250 MG/1
250 TABLET, FILM COATED ORAL SEE ADMIN INSTRUCTIONS
Qty: 6 TABLET | Refills: 0 | Status: SHIPPED | OUTPATIENT
Start: 2023-12-06 | End: 2023-12-11

## 2023-12-06 NOTE — TELEPHONE ENCOUNTER
Prescribed Azithromycin 2050 mg tablet, take 2 tablets today, then 1 tablet daily for 4 days for management of sinusitis on 12/6/2023.

## 2024-02-21 DIAGNOSIS — G44.229 CHRONIC TENSION-TYPE HEADACHE, NOT INTRACTABLE: ICD-10-CM

## 2024-02-21 RX ORDER — TOPIRAMATE 25 MG/1
TABLET ORAL
Qty: 270 TABLET | Refills: 0 | Status: SHIPPED | OUTPATIENT
Start: 2024-02-21

## 2024-02-22 ENCOUNTER — OFFICE VISIT (OUTPATIENT)
Facility: CLINIC | Age: 69
End: 2024-02-22
Payer: MEDICARE

## 2024-02-22 VITALS
TEMPERATURE: 96.9 F | WEIGHT: 118.4 LBS | HEIGHT: 62 IN | BODY MASS INDEX: 21.79 KG/M2 | HEART RATE: 78 BPM | OXYGEN SATURATION: 98 % | SYSTOLIC BLOOD PRESSURE: 119 MMHG | RESPIRATION RATE: 18 BRPM | DIASTOLIC BLOOD PRESSURE: 67 MMHG

## 2024-02-22 DIAGNOSIS — G62.9 NEUROPATHY: ICD-10-CM

## 2024-02-22 DIAGNOSIS — F33.1 DEPRESSION, MAJOR, RECURRENT, MODERATE (HCC): ICD-10-CM

## 2024-02-22 DIAGNOSIS — F51.01 PRIMARY INSOMNIA: ICD-10-CM

## 2024-02-22 DIAGNOSIS — E78.2 MIXED HYPERLIPIDEMIA: Primary | ICD-10-CM

## 2024-02-22 PROCEDURE — 3017F COLORECTAL CA SCREEN DOC REV: CPT | Performed by: NURSE PRACTITIONER

## 2024-02-22 PROCEDURE — G8400 PT W/DXA NO RESULTS DOC: HCPCS | Performed by: NURSE PRACTITIONER

## 2024-02-22 PROCEDURE — 1036F TOBACCO NON-USER: CPT | Performed by: NURSE PRACTITIONER

## 2024-02-22 PROCEDURE — G8484 FLU IMMUNIZE NO ADMIN: HCPCS | Performed by: NURSE PRACTITIONER

## 2024-02-22 PROCEDURE — 99214 OFFICE O/P EST MOD 30 MIN: CPT | Performed by: NURSE PRACTITIONER

## 2024-02-22 PROCEDURE — G8420 CALC BMI NORM PARAMETERS: HCPCS | Performed by: NURSE PRACTITIONER

## 2024-02-22 PROCEDURE — G8427 DOCREV CUR MEDS BY ELIG CLIN: HCPCS | Performed by: NURSE PRACTITIONER

## 2024-02-22 PROCEDURE — 1123F ACP DISCUSS/DSCN MKR DOCD: CPT | Performed by: NURSE PRACTITIONER

## 2024-02-22 PROCEDURE — 1090F PRES/ABSN URINE INCON ASSESS: CPT | Performed by: NURSE PRACTITIONER

## 2024-02-22 ASSESSMENT — PATIENT HEALTH QUESTIONNAIRE - PHQ9
1. LITTLE INTEREST OR PLEASURE IN DOING THINGS: 1
3. TROUBLE FALLING OR STAYING ASLEEP: 0
2. FEELING DOWN, DEPRESSED OR HOPELESS: 1
5. POOR APPETITE OR OVEREATING: 0
SUM OF ALL RESPONSES TO PHQ QUESTIONS 1-9: 2
9. THOUGHTS THAT YOU WOULD BE BETTER OFF DEAD, OR OF HURTING YOURSELF: 0
SUM OF ALL RESPONSES TO PHQ QUESTIONS 1-9: 2
7. TROUBLE CONCENTRATING ON THINGS, SUCH AS READING THE NEWSPAPER OR WATCHING TELEVISION: 0
DEPRESSION UNABLE TO ASSESS: PT REFUSES
6. FEELING BAD ABOUT YOURSELF - OR THAT YOU ARE A FAILURE OR HAVE LET YOURSELF OR YOUR FAMILY DOWN: 0
SUM OF ALL RESPONSES TO PHQ QUESTIONS 1-9: 2
SUM OF ALL RESPONSES TO PHQ QUESTIONS 1-9: 2
8. MOVING OR SPEAKING SO SLOWLY THAT OTHER PEOPLE COULD HAVE NOTICED. OR THE OPPOSITE, BEING SO FIGETY OR RESTLESS THAT YOU HAVE BEEN MOVING AROUND A LOT MORE THAN USUAL: 0
SUM OF ALL RESPONSES TO PHQ9 QUESTIONS 1 & 2: 2
4. FEELING TIRED OR HAVING LITTLE ENERGY: 0

## 2024-02-22 NOTE — PROGRESS NOTES
Brenda Mendes presents today for   Chief Complaint   Patient presents with    Follow-up     6m follow up        Is someone accompanying this pt? no    Is the patient using any DME equipment during OV? no    Health Maintenance reviewed and discussed and ordered per Provider.    Health Maintenance Due   Topic Date Due    DTaP/Tdap/Td vaccine (1 - Tdap) Never done    Shingles vaccine (1 of 2) Never done    DEXA (modify frequency per FRAX score)  Never done    Respiratory Syncytial Virus (RSV) Pregnant or age 60 yrs+ (1 - 1-dose 60+ series) Never done    Breast cancer screen  10/14/2021    Pneumococcal 65+ years Vaccine (1 - PCV) Never done    Flu vaccine (1) 08/01/2023    COVID-19 Vaccine (3 - 2023-24 season) 09/01/2023   .      \"Have you been to the ER, urgent care clinic since your last visit?  Hospitalized since your last visit?\"    NO    “Have you seen or consulted any other health care providers outside of [facility] since your last visit?”    NO       Have you had a mammogram?”   NO          
34.0 PG Final    MCHC 08/25/2023 32.1  31.0 - 37.0 g/dL Final    RDW 08/25/2023 12.9  11.6 - 14.5 % Final    Platelets 08/25/2023 368  135 - 420 K/uL Final    MPV 08/25/2023 9.9  9.2 - 11.8 FL Final    Nucleated RBCs 08/25/2023 0.0  0.0  WBC Final    nRBC 08/25/2023 0.00  0.00 - 0.01 K/uL Final    Neutrophils % 08/25/2023 72  40 - 73 % Final    Lymphocytes % 08/25/2023 18 (L)  21 - 52 % Final    Monocytes % 08/25/2023 8  3 - 10 % Final    Eosinophils % 08/25/2023 1  0 - 5 % Final    Basophils % 08/25/2023 1  0 - 2 % Final    Immature Granulocytes 08/25/2023 0  0 - 0.5 % Final    Neutrophils Absolute 08/25/2023 5.2  1.8 - 8.0 K/UL Final    Lymphocytes Absolute 08/25/2023 1.3  0.9 - 3.6 K/UL Final    Monocytes Absolute 08/25/2023 0.6  0.05 - 1.2 K/UL Final    Eosinophils Absolute 08/25/2023 0.0  0.0 - 0.4 K/UL Final    Basophils Absolute 08/25/2023 0.1  0.0 - 0.1 K/UL Final    Absolute Immature Granulocyte 08/25/2023 0.0  0.00 - 0.04 K/UL Final    Differential Type 08/25/2023 AUTOMATED    Final    Sodium 08/25/2023 139  136 - 145 mmol/L Final    Potassium 08/25/2023 3.7  3.5 - 5.5 mmol/L Final    Chloride 08/25/2023 109  100 - 111 mmol/L Final    CO2 08/25/2023 26  21 - 32 mmol/L Final    Anion Gap 08/25/2023 4  3.0 - 18.0 mmol/L Final    Glucose 08/25/2023 100 (H)  74 - 99 mg/dL Final    BUN 08/25/2023 14  7 - 18 mg/dL Final    Creatinine 08/25/2023 0.94  0.60 - 1.30 mg/dL Final    Bun/Cre Ratio 08/25/2023 15  12 - 20   Final    EstShantel Filt Rate 08/25/2023 >60  >60 ml/min/1.73m2 Final    Comment:    Pediatric calculator link: https://www.kidney.org/professionals/kdoqi/gfr_calculatorped    These results are not intended for use in patients <18 years of age.     eGFR results are calculated without a race factor using  the 2021 CKD-EPI equation. Careful clinical correlation is recommended, particularly when comparing to results calculated using previous equations.  The CKD-EPI equation is less accurate in

## 2024-05-29 DIAGNOSIS — F41.1 GENERALIZED ANXIETY DISORDER: ICD-10-CM

## 2024-05-29 DIAGNOSIS — E78.2 MIXED HYPERLIPIDEMIA: ICD-10-CM

## 2024-05-29 RX ORDER — ATORVASTATIN CALCIUM 20 MG/1
20 TABLET, FILM COATED ORAL DAILY
Qty: 90 TABLET | Refills: 1 | Status: SHIPPED | OUTPATIENT
Start: 2024-05-29

## 2024-05-29 RX ORDER — CITALOPRAM 40 MG/1
40 TABLET ORAL DAILY
Qty: 90 TABLET | Refills: 1 | Status: SHIPPED | OUTPATIENT
Start: 2024-05-29

## 2024-06-05 DIAGNOSIS — F51.01 PRIMARY INSOMNIA: ICD-10-CM

## 2024-06-05 RX ORDER — TRAZODONE HYDROCHLORIDE 100 MG/1
100 TABLET ORAL NIGHTLY
Qty: 90 TABLET | Refills: 1 | Status: SHIPPED | OUTPATIENT
Start: 2024-06-05

## 2024-08-06 ENCOUNTER — TELEPHONE (OUTPATIENT)
Facility: CLINIC | Age: 69
End: 2024-08-06

## 2024-08-06 DIAGNOSIS — R79.9 ABNORMAL FINDING OF BLOOD CHEMISTRY, UNSPECIFIED: ICD-10-CM

## 2024-08-06 DIAGNOSIS — R68.89 OTHER GENERAL SYMPTOMS AND SIGNS: ICD-10-CM

## 2024-08-06 DIAGNOSIS — R60.0 BILATERAL LOWER EXTREMITY EDEMA: ICD-10-CM

## 2024-08-06 DIAGNOSIS — E78.2 MIXED HYPERLIPIDEMIA: Primary | ICD-10-CM

## 2024-08-06 NOTE — TELEPHONE ENCOUNTER
Patient called stating she has been having swelling in her feet and ankles. I asked the patient does she have trouble with fluid she states she does but never to this extent. Spoke with patient about edema and asked was she taking her lasix and she claims she takes it every other day. Patient wanting to know if she should be concerned and what to do.    Patient can be reached back at [phone]

## 2024-08-06 NOTE — TELEPHONE ENCOUNTER
Spoke with via phone call on 8/6/2024.  Patient reports intermittent bilateral lower extremity edema lower legs and feet.  She reports she has been using prescribed Furosemide 20 mg tablet, but has not observed significant decrease in bilateral lower extremity edema.  She reports the edema will resolve on its own, but seems to recur every 1.5 weeks.  Ordered annual labs and EKG.  Patient understood and had no further questions at this time.

## 2024-08-07 ENCOUNTER — HOSPITAL ENCOUNTER (OUTPATIENT)
Age: 69
Discharge: HOME | End: 2024-08-10
Payer: MEDICARE

## 2024-08-07 DIAGNOSIS — R68.89 OTHER GENERAL SYMPTOMS AND SIGNS: ICD-10-CM

## 2024-08-07 DIAGNOSIS — E78.2 MIXED HYPERLIPIDEMIA: ICD-10-CM

## 2024-08-07 DIAGNOSIS — R79.9 ABNORMAL FINDING OF BLOOD CHEMISTRY, UNSPECIFIED: ICD-10-CM

## 2024-08-07 DIAGNOSIS — R60.0 BILATERAL LOWER EXTREMITY EDEMA: ICD-10-CM

## 2024-08-07 LAB
ALBUMIN SERPL-MCNC: 3.4 G/DL (ref 3.4–5)
ALBUMIN/GLOB SERPL: 1 (ref 0.8–1.7)
ALP SERPL-CCNC: 82 U/L (ref 45–117)
ALT SERPL-CCNC: 17 U/L (ref 13–56)
ANION GAP SERPL CALC-SCNC: 4 MMOL/L (ref 3–18)
AST SERPL W P-5'-P-CCNC: 11 U/L (ref 10–38)
BASOPHILS # BLD: 0.1 K/UL (ref 0–0.1)
BASOPHILS NFR BLD: 1 % (ref 0–2)
BILIRUB SERPL-MCNC: 0.6 MG/DL (ref 0.2–1)
BUN SERPL-MCNC: 9 MG/DL (ref 7–18)
BUN/CREAT SERPL: 12 (ref 12–20)
CA-I BLD-MCNC: 9 MG/DL (ref 8.5–10.1)
CHLORIDE SERPL-SCNC: 109 MMOL/L (ref 100–111)
CHOLEST SERPL-MCNC: 194 MG/DL
CO2 SERPL-SCNC: 29 MMOL/L (ref 21–32)
CREAT SERPL-MCNC: 0.73 MG/DL (ref 0.6–1.3)
DIFFERENTIAL METHOD BLD: ABNORMAL
EOSINOPHIL # BLD: 0.1 K/UL (ref 0–0.4)
EOSINOPHIL NFR BLD: 2 % (ref 0–5)
ERYTHROCYTE [DISTWIDTH] IN BLOOD BY AUTOMATED COUNT: 12.8 % (ref 11.6–14.5)
EST. AVERAGE GLUCOSE BLD GHB EST-MCNC: 103 MG/DL
GLOBULIN SER CALC-MCNC: 3.5 G/DL (ref 2–4)
GLUCOSE SERPL-MCNC: 109 MG/DL (ref 74–99)
HBA1C MFR BLD: 5.2 % (ref 4.2–5.6)
HDLC SERPL-MCNC: 56 MG/DL (ref 40–60)
HDLC SERPL: 3.5 (ref 0–5)
HGB BLD-MCNC: 13.4 G/DL (ref 12–16)
IMM GRANULOCYTES # BLD AUTO: 0 K/UL (ref 0–0.04)
IMM GRANULOCYTES NFR BLD AUTO: 1 % (ref 0–0.5)
LDLC SERPL CALC-MCNC: 124.4 MG/DL (ref 0–100)
LIPID PANEL: ABNORMAL
LYMPHOCYTES # BLD: 1.1 K/UL (ref 0.9–3.6)
LYMPHOCYTES NFR BLD: 13 % (ref 21–52)
MCH RBC QN AUTO: 31.6 PG (ref 24–34)
MCHC RBC AUTO-ENTMCNC: 32.1 G/DL (ref 31–37)
MCV RBC AUTO: 98.6 FL (ref 78–100)
MONOCYTES # BLD: 0.7 K/UL (ref 0.05–1.2)
MONOCYTES NFR BLD: 8 % (ref 3–10)
NEUTS SEG # BLD: 6.3 K/UL (ref 1.8–8)
NEUTS SEG NFR BLD: 75 % (ref 40–73)
NRBC # BLD: 0 K/UL (ref 0–0.01)
NRBC BLD-RTO: 0 PER 100 WBC
PLATELET # BLD AUTO: 353 K/UL (ref 135–420)
PMV BLD AUTO: 10.3 FL (ref 9.2–11.8)
POTASSIUM SERPL-SCNC: 4 MMOL/L (ref 3.5–5.5)
PROT SERPL-MCNC: 6.9 G/DL (ref 6.4–8.2)
RBC # BLD AUTO: 4.24 M/UL (ref 4.2–5.3)
SODIUM SERPL-SCNC: 142 MMOL/L (ref 136–145)
TRIGL SERPL-MCNC: 68 MG/DL
TSH SERPL DL<=0.05 MIU/L-ACNC: 0.85 UIU/ML (ref 0.36–3.74)
VIT B12 SERPL-MCNC: >2000 PG/ML (ref 211–911)
VLDLC SERPL CALC-MCNC: 13.6 MG/DL
WBC # BLD AUTO: 8.3 K/UL (ref 4.6–13.2)

## 2024-08-07 PROCEDURE — 80053 COMPREHEN METABOLIC PANEL: CPT

## 2024-08-07 PROCEDURE — 85025 COMPLETE CBC W/AUTO DIFF WBC: CPT

## 2024-08-07 PROCEDURE — 83036 HEMOGLOBIN GLYCOSYLATED A1C: CPT

## 2024-08-07 PROCEDURE — 93005 ELECTROCARDIOGRAM TRACING: CPT | Performed by: NURSE PRACTITIONER

## 2024-08-07 PROCEDURE — 80061 LIPID PANEL: CPT

## 2024-08-07 PROCEDURE — 82607 VITAMIN B-12: CPT

## 2024-08-07 PROCEDURE — 84443 ASSAY THYROID STIM HORMONE: CPT

## 2024-08-07 PROCEDURE — 36415 COLL VENOUS BLD VENIPUNCTURE: CPT

## 2024-08-08 LAB
EKG ATRIAL RATE: 77 BPM
EKG DIAGNOSIS: NORMAL
EKG P AXIS: 82 DEGREES
EKG P-R INTERVAL: 120 MS
EKG Q-T INTERVAL: 386 MS
EKG QRS DURATION: 62 MS
EKG QTC CALCULATION (BAZETT): 436 MS
EKG R AXIS: 85 DEGREES
EKG T AXIS: 67 DEGREES
EKG VENTRICULAR RATE: 77 BPM

## 2024-08-09 DIAGNOSIS — R94.31 ABNORMAL EKG: Primary | ICD-10-CM

## 2024-08-09 NOTE — PROGRESS NOTES
Spoke with patient via phone call on 8/9/2024 of EKG and laboratory results.  EKG abnormal and revealed age-indeterminate septal infarct.  Patient reports no history of heart attack or ischemia.  Patient referred to [facility] on 8/9/2024 in [place], [place] per patient preference.

## 2024-08-22 ENCOUNTER — OFFICE VISIT (OUTPATIENT)
Facility: CLINIC | Age: 69
End: 2024-08-22
Payer: MEDICARE

## 2024-08-22 VITALS
HEART RATE: 77 BPM | TEMPERATURE: 97.9 F | OXYGEN SATURATION: 98 % | WEIGHT: 116.6 LBS | BODY MASS INDEX: 21.46 KG/M2 | HEIGHT: 62 IN | SYSTOLIC BLOOD PRESSURE: 123 MMHG | DIASTOLIC BLOOD PRESSURE: 65 MMHG | RESPIRATION RATE: 18 BRPM

## 2024-08-22 DIAGNOSIS — E78.2 MIXED HYPERLIPIDEMIA: ICD-10-CM

## 2024-08-22 DIAGNOSIS — G44.229 CHRONIC TENSION-TYPE HEADACHE, NOT INTRACTABLE: ICD-10-CM

## 2024-08-22 DIAGNOSIS — F51.01 PRIMARY INSOMNIA: ICD-10-CM

## 2024-08-22 DIAGNOSIS — F41.1 GENERALIZED ANXIETY DISORDER: ICD-10-CM

## 2024-08-22 PROCEDURE — G8420 CALC BMI NORM PARAMETERS: HCPCS | Performed by: NURSE PRACTITIONER

## 2024-08-22 PROCEDURE — G8400 PT W/DXA NO RESULTS DOC: HCPCS | Performed by: NURSE PRACTITIONER

## 2024-08-22 PROCEDURE — 1036F TOBACCO NON-USER: CPT | Performed by: NURSE PRACTITIONER

## 2024-08-22 PROCEDURE — 3017F COLORECTAL CA SCREEN DOC REV: CPT | Performed by: NURSE PRACTITIONER

## 2024-08-22 PROCEDURE — 1123F ACP DISCUSS/DSCN MKR DOCD: CPT | Performed by: NURSE PRACTITIONER

## 2024-08-22 PROCEDURE — 1090F PRES/ABSN URINE INCON ASSESS: CPT | Performed by: NURSE PRACTITIONER

## 2024-08-22 PROCEDURE — 99214 OFFICE O/P EST MOD 30 MIN: CPT | Performed by: NURSE PRACTITIONER

## 2024-08-22 PROCEDURE — G8427 DOCREV CUR MEDS BY ELIG CLIN: HCPCS | Performed by: NURSE PRACTITIONER

## 2024-08-22 RX ORDER — TOPIRAMATE 25 MG/1
TABLET ORAL
Qty: 270 TABLET | Refills: 0 | Status: SHIPPED | OUTPATIENT
Start: 2024-08-22

## 2024-08-22 RX ORDER — TRAZODONE HYDROCHLORIDE 100 MG/1
100 TABLET ORAL NIGHTLY
Qty: 90 TABLET | Refills: 1 | Status: SHIPPED | OUTPATIENT
Start: 2024-08-22

## 2024-08-22 RX ORDER — CITALOPRAM 40 MG/1
40 TABLET ORAL DAILY
Qty: 90 TABLET | Refills: 1 | Status: SHIPPED | OUTPATIENT
Start: 2024-08-22

## 2024-08-22 RX ORDER — ATORVASTATIN CALCIUM 20 MG/1
20 TABLET, FILM COATED ORAL DAILY
Qty: 90 TABLET | Refills: 1 | Status: SHIPPED | OUTPATIENT
Start: 2024-08-22

## 2024-08-22 NOTE — PROGRESS NOTES
Chief Complaint   Patient presents with    Follow-up     6 month follow up        \"Have you been to the ER, urgent care clinic since your last visit?  Hospitalized since your last visit?\"    NO    “Have you seen or consulted any other health care providers outside of [facility] since your last visit?”    NO       Have you had a mammogram?”   NO           
BUN/Creatinine Ratio 08/07/2024 12  12 - 20   Final    Est, Gloregulo Filt Rate 08/07/2024 >90  >60 ml/min/1.73m2 Final    Comment:    Pediatric calculator link: https://www.kidney.org/professionals/kdoqi/gfr_calculatorped    These results are not intended for use in patients <18 years of age.     eGFR results are calculated without a race factor using  the 2021 CKD-EPI equation. Careful clinical correlation is recommended, particularly when comparing to results calculated using previous equations.  The CKD-EPI equation is less accurate in patients with extremes of muscle mass, extra-renal metabolism of creatinine, excessive creatine ingestion, or following therapy that affects renal tubular secretion.      Calcium 08/07/2024 9.0  8.5 - 10.1 mg/dL Final    Total Bilirubin 08/07/2024 0.6  0.2 - 1.0 mg/dL Final    AST 08/07/2024 11  10 - 38 U/L Final    ALT 08/07/2024 17  13 - 56 U/L Final    Alk Phosphatase 08/07/2024 82  45 - 117 U/L Final    Total Protein 08/07/2024 6.9  6.4 - 8.2 g/dL Final    Albumin 08/07/2024 3.4  3.4 - 5.0 g/dL Final    Globulin 08/07/2024 3.5  2.0 - 4.0 g/dL Final    Albumin/Globulin Ratio 08/07/2024 1.0  0.8 - 1.7   Final    Hemoglobin A1C 08/07/2024 5.2  4.2 - 5.6 % Final    Comment: (NOTE)  HbA1C Interpretive Ranges  <5.7              Normal  5.7 - 6.4         Consider Prediabetes  >6.5              Consider Diabetes      Estimated Avg Glucose 08/07/2024 103  mg/dL Final    Comment: (NOTE)  The eAG should be interpreted with patient characteristics in mind   since ethnicity, interindividual differences, red cell lifespan,   variation in rates of glycation, etc. may affect the validity of the   calculation.      LIPID PANEL 08/07/2024      Final    Cholesterol, Total 08/07/2024 194  <200 mg/dL Final    Triglycerides 08/07/2024 68  <150 mg/dL Final    Comment: The drugs N-acetylcysteine (NAC) and  Metamiszole have been found to cause falsely  low results in this chemical assay. Please  be sure to

## 2024-09-04 ENCOUNTER — COMMUNITY OUTREACH (OUTPATIENT)
Facility: CLINIC | Age: 69
End: 2024-09-04

## 2024-10-30 LAB — LEFT VENTRICULAR EJECTION FRACTION, EXTERNAL: NORMAL

## 2024-12-09 DIAGNOSIS — G44.229 CHRONIC TENSION-TYPE HEADACHE, NOT INTRACTABLE: ICD-10-CM

## 2024-12-09 RX ORDER — TOPIRAMATE 25 MG/1
TABLET, FILM COATED ORAL
Qty: 270 TABLET | Refills: 0 | Status: SHIPPED | OUTPATIENT
Start: 2024-12-09

## 2025-04-27 DIAGNOSIS — F51.01 PRIMARY INSOMNIA: ICD-10-CM

## 2025-04-27 DIAGNOSIS — G44.229 CHRONIC TENSION-TYPE HEADACHE, NOT INTRACTABLE: ICD-10-CM

## 2025-04-27 DIAGNOSIS — F41.1 GENERALIZED ANXIETY DISORDER: ICD-10-CM

## 2025-04-28 RX ORDER — CITALOPRAM HYDROBROMIDE 40 MG/1
40 TABLET ORAL DAILY
Qty: 90 TABLET | Refills: 0 | Status: SHIPPED | OUTPATIENT
Start: 2025-04-28

## 2025-04-28 RX ORDER — TOPIRAMATE 25 MG/1
TABLET, FILM COATED ORAL
Qty: 270 TABLET | Refills: 0 | Status: SHIPPED | OUTPATIENT
Start: 2025-04-28

## 2025-04-28 RX ORDER — TRAZODONE HYDROCHLORIDE 100 MG/1
100 TABLET ORAL NIGHTLY
Qty: 90 TABLET | Refills: 0 | Status: SHIPPED | OUTPATIENT
Start: 2025-04-28